# Patient Record
Sex: FEMALE | Race: WHITE | NOT HISPANIC OR LATINO | Employment: UNEMPLOYED | URBAN - METROPOLITAN AREA
[De-identification: names, ages, dates, MRNs, and addresses within clinical notes are randomized per-mention and may not be internally consistent; named-entity substitution may affect disease eponyms.]

---

## 2017-01-03 ENCOUNTER — TELEPHONE (OUTPATIENT)
Dept: ORTHOPEDICS | Facility: CLINIC | Age: 27
End: 2017-01-03

## 2017-01-04 ENCOUNTER — TELEPHONE (OUTPATIENT)
Dept: ORTHOPEDICS | Facility: CLINIC | Age: 27
End: 2017-01-04

## 2017-01-04 NOTE — TELEPHONE ENCOUNTER
----- Message from Beth Sesay sent at 1/4/2017  2:19 PM CST -----  Contact: Patient  Patient called to speak with the nurse; she left a message yesterday about her medical leave paperwork and no one has responded. She stated she needs it today or she will be fired. Please call her at [phone].    Thanks,  Beth

## 2017-01-05 ENCOUNTER — TELEPHONE (OUTPATIENT)
Dept: ORTHOPEDICS | Facility: CLINIC | Age: 27
End: 2017-01-05

## 2017-01-05 NOTE — TELEPHONE ENCOUNTER
Spoke with patient and confirmed x-ray appointment for 1-9-2017. Pt states she will call back if she cant make it.

## 2017-01-06 ENCOUNTER — TELEPHONE (OUTPATIENT)
Dept: ORTHOPEDICS | Facility: CLINIC | Age: 27
End: 2017-01-06

## 2017-01-06 NOTE — TELEPHONE ENCOUNTER
Patient stated that her previous RTW note did not have restrictions on it and the paperwork she just picked up said she has restrictions such as no lifting and no climbing, etc.  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 Her work will not allow her to go back with restrictions.    I spoke with the nursing staff and they stated that Dr. Cancino is who will have to change the paperwork. He is in surgery right now. The patient has an appt on Monday. We can address the issue when she comes in on Monday.     She verbalized understanding.

## 2017-01-06 NOTE — TELEPHONE ENCOUNTER
----- Message from Irma Vizcaino sent at 1/6/2017 12:50 PM CST -----  Contact: pt  Call pt at [phone]//regarding a return to work statement and do i have restrictions //ignacio ht

## 2017-01-09 ENCOUNTER — OFFICE VISIT (OUTPATIENT)
Dept: ORTHOPEDICS | Facility: CLINIC | Age: 27
End: 2017-01-09
Payer: COMMERCIAL

## 2017-01-09 ENCOUNTER — HOSPITAL ENCOUNTER (OUTPATIENT)
Dept: RADIOLOGY | Facility: HOSPITAL | Age: 27
Discharge: HOME | End: 2017-01-09
Attending: ORTHOPAEDIC SURGERY
Payer: COMMERCIAL

## 2017-01-09 VITALS
BODY MASS INDEX: 24.83 KG/M2 | HEIGHT: 68 IN | SYSTOLIC BLOOD PRESSURE: 117 MMHG | WEIGHT: 163.81 LBS | HEART RATE: 69 BPM | DIASTOLIC BLOOD PRESSURE: 74 MMHG

## 2017-01-09 DIAGNOSIS — S92.504D CLOSED NONDISPLACED FRACTURE OF PHALANX OF LESSER TOE OF RIGHT FOOT WITH ROUTINE HEALING, UNSPECIFIED PHALANX, SUBSEQUENT ENCOUNTER: Primary | ICD-10-CM

## 2017-01-09 DIAGNOSIS — M79.671 RIGHT FOOT PAIN: ICD-10-CM

## 2017-01-09 PROCEDURE — 99999 PR PBB SHADOW E&M-EST. PATIENT-LVL III: CPT | Mod: PBBFAC,,, | Performed by: ORTHOPAEDIC SURGERY

## 2017-01-09 PROCEDURE — 99024 POSTOP FOLLOW-UP VISIT: CPT | Mod: S$GLB,,, | Performed by: ORTHOPAEDIC SURGERY

## 2017-01-09 PROCEDURE — 73630 X-RAY EXAM OF FOOT: CPT | Mod: TC,RT

## 2017-01-09 PROCEDURE — 73630 X-RAY EXAM OF FOOT: CPT | Mod: 26,RT,, | Performed by: RADIOLOGY

## 2017-01-24 NOTE — PROGRESS NOTES
Subjective:      Patient ID: Idalia Sanders is a 26 y.o. female.    Chief Complaint: Pain of the Right Foot    HPI: The patient presents in follow-up for a healing fracture of her right fifth/little toe proximal phalanx.  She has done nicely and is clinically nontender.    Review of Systems   Gastrointestinal: Negative.  Negative for constipation, diarrhea, nausea and vomiting.         Objective:            Ortho/SPM Exam the patient has no tenderness to palpation of the little toe.  She has some minimal residual swelling as might be expected.              X-RAY: None performed today.    Assessment:           Encounter Diagnosis   Name Primary?    Closed nondisplaced fracture of phalanx of lesser toe of right foot with routine healing, unspecified phalanx, subsequent encounter Yes          Plan:     The patient was instructed on desensitizing massage.  She is cleared for all activities without restriction at this time.  She will be seen back as needed in the future.

## 2017-03-24 ENCOUNTER — OFFICE VISIT (OUTPATIENT)
Dept: FAMILY MEDICINE | Facility: CLINIC | Age: 27
End: 2017-03-24
Payer: COMMERCIAL

## 2017-03-24 VITALS
TEMPERATURE: 96 F | SYSTOLIC BLOOD PRESSURE: 122 MMHG | OXYGEN SATURATION: 98 % | BODY MASS INDEX: 22.73 KG/M2 | DIASTOLIC BLOOD PRESSURE: 70 MMHG | WEIGHT: 150 LBS | HEIGHT: 68 IN | HEART RATE: 100 BPM

## 2017-03-24 DIAGNOSIS — R51.9 HEADACHE, UNSPECIFIED HEADACHE TYPE: ICD-10-CM

## 2017-03-24 DIAGNOSIS — K52.9 GASTROENTERITIS: Primary | ICD-10-CM

## 2017-03-24 DIAGNOSIS — E86.0 MILD DEHYDRATION: ICD-10-CM

## 2017-03-24 DIAGNOSIS — R11.2 NON-INTRACTABLE VOMITING WITH NAUSEA, UNSPECIFIED VOMITING TYPE: ICD-10-CM

## 2017-03-24 DIAGNOSIS — R19.7 DIARRHEA, UNSPECIFIED TYPE: ICD-10-CM

## 2017-03-24 PROCEDURE — 96372 THER/PROPH/DIAG INJ SC/IM: CPT | Mod: S$GLB,,, | Performed by: FAMILY MEDICINE

## 2017-03-24 PROCEDURE — 99214 OFFICE O/P EST MOD 30 MIN: CPT | Mod: 25,S$GLB,, | Performed by: FAMILY MEDICINE

## 2017-03-24 PROCEDURE — 99999 PR PBB SHADOW E&M-EST. PATIENT-LVL III: CPT | Mod: PBBFAC,,, | Performed by: FAMILY MEDICINE

## 2017-03-24 RX ORDER — ONDANSETRON 4 MG/1
4 TABLET, ORALLY DISINTEGRATING ORAL EVERY 6 HOURS PRN
Qty: 20 TABLET | Refills: 0 | Status: SHIPPED | OUTPATIENT
Start: 2017-03-24 | End: 2017-07-07

## 2017-03-24 RX ORDER — PROMETHAZINE HYDROCHLORIDE 25 MG/ML
25 INJECTION, SOLUTION INTRAMUSCULAR; INTRAVENOUS
Status: COMPLETED | OUTPATIENT
Start: 2017-03-24 | End: 2017-03-24

## 2017-03-24 RX ADMIN — PROMETHAZINE HYDROCHLORIDE 25 MG: 25 INJECTION, SOLUTION INTRAMUSCULAR; INTRAVENOUS at 11:03

## 2017-03-24 NOTE — PATIENT INSTRUCTIONS
Diet for Vomiting or Diarrhea (Adult)    Your symptoms may return or get worse after eating certain foods listed below. If this happens, stop eating these foods until your symptoms ease and you feel better.  Once the vomiting stops, follow the steps below.   During the first 12 to 24 hours  During the first 12 to 24 hours, follow this diet:  · Drinks. Plain water, sport drinks like electrolyte solutions, soft drinks without caffeine, mineral water (plain or flavored), clear fruit juices, and decaffeinated tea and coffee.  · Soups. Clear broth.  · Desserts. Plain gelatin, popsicles, and fruit juice bars. As you feel better, you may add 6 to 8 ounces of yogurt per day. If you have diarrhea, don't have foods or drinks that contain sugar, high-fructose corn syrup, or sugar alcohols.  During the next 24 hours  During the next 24 hours you may add the following to the above:  · Hot cereal, plain toast, bread, rolls, and crackers  · Plain noodles, rice, mashed potatoes, and chicken noodle or rice soup  · Unsweetened canned fruit (but not pineapple) and bananas  Don't eat more than 15 grams of fat a day. Do this by staying away from margarine, butter, oils, mayonnaise, sauces, gravies, fried foods, peanut butter, meat, poultry, and fish.  Don't eat much fiber. Stay away from raw or cooked vegetables, fresh fruits (except bananas), and bran cereals.  Limit how much caffeine and chocolate you have. Do not use any spices or seasonings except salt.  During the next 24 hours  Slowly go back to your normal diet, as you feel better and your symptoms ease.  Date Last Reviewed: 8/1/2016  © 9785-0212 [employer]. [address] [place] [address], [place] [address]. All rights reserved. This information is not intended as a substitute for professional medical care. Always follow your healthcare professional's instructions.

## 2017-03-24 NOTE — LETTER
March 24, 2017      [facility]  [address] [facility] [address]  Phone: [phone]  Fax: [phone]       Patient: Idalia Sanders   YOB: 1990  Date of Visit: 03/24/2017    To Whom It May Concern:    Idalia was seen at [facility] on 03/24/2017. Patient was also ill March 23,2017. If you have any questions or concerns, or if I can be of further assistance, please do not hesitate to contact me.    Sincerely,        Dr Pfeiffer

## 2017-03-24 NOTE — LETTER
March 24, 2017      [facility]  [address] [facility] [address]  Phone: [phone]  Fax: [phone]       Patient: Idalia Sanders   YOB: 1990  Date of Visit: 03/24/2017    To Whom It May Concern:    Idalia was seen at [facility] on 03/24/2017 patient was ill March 23,2017 also. She may return to work on March 27,2107 . If you have any questions or concerns, or if I can be of further assistance, please do not hesitate to contact me.    Sincerely,          Dr. Pfeiffer

## 2017-03-24 NOTE — PROGRESS NOTES
Subjective:       Patient ID: Idalia Sanders is a 26 y.o. female.    Chief Complaint: Abdominal Pain (for 2 days/vomiting); Headache; Nausea; Fever; and Diarrhea      HPI Comments:   Peviously healthy. Abrupt onset 2 days ago of frontal HA, followed by diarrhea vomiting. No blood. Crampy abd. pain.No HA currently. Able to keep down some fluids and toast. Last vomit yest. Lots of co-workers with same. Decr. Urine output. Regular period, on now. No cold, cough symptoms.  Abdominal Pain   The quality of the pain is cramping. The abdominal pain radiates to the epigastric region. Associated symptoms include anorexia, belching, diarrhea, flatus, headaches, myalgias, nausea, vomiting and weight loss. Pertinent negatives include no arthralgias, constipation, dysuria, fever, frequency or hematuria. The pain is aggravated by eating and certain positions. The pain is relieved by belching, certain positions, bowel movements, passing flatus and vomiting. Treatments tried: tylenol, zantac, midol. The treatment provided no relief.   Diarrhea    Associated symptoms include abdominal pain, headaches, increased flatus, myalgias, vomiting and weight loss. Pertinent negatives include no arthralgias, chills, coughing or fever.     Review of Systems   Constitutional: Positive for activity change, appetite change, unexpected weight change and weight loss. Negative for chills, fatigue and fever.   HENT: Negative for congestion, ear pain, rhinorrhea and sore throat.    Eyes: Negative for photophobia and visual disturbance.   Respiratory: Negative for cough and shortness of breath.    Cardiovascular: Negative for chest pain, palpitations and leg swelling.   Gastrointestinal: Positive for abdominal pain, anorexia, diarrhea, flatus, nausea and vomiting. Negative for blood in stool and constipation.   Endocrine: Negative for cold intolerance, heat intolerance and polyuria.   Genitourinary: Negative for dysuria, frequency and hematuria.    Musculoskeletal: Positive for myalgias. Negative for arthralgias, back pain, joint swelling and neck stiffness.   Skin: Negative for rash.   Allergic/Immunologic: Negative for environmental allergies and food allergies.   Neurological: Positive for headaches. Negative for dizziness and weakness.   Hematological: Negative for adenopathy.   Psychiatric/Behavioral: Negative for confusion and sleep disturbance. The patient is not nervous/anxious.        Objective:      Physical Exam   Constitutional: She is oriented to person, place, and time. She appears well-developed and well-nourished. No distress.   Appears moderately ill. Moves well around the room.   HENT:   Head: Normocephalic.   Right Ear: External ear normal.   Left Ear: External ear normal.   Mouth/Throat: Oropharynx is clear and moist. No oropharyngeal exudate.   Eyes: Conjunctivae and EOM are normal. Pupils are equal, round, and reactive to light. Right eye exhibits no discharge. Left eye exhibits no discharge. No scleral icterus.   Neck: Normal range of motion. Neck supple. No JVD present. No thyromegaly present.   Cardiovascular: Normal rate, regular rhythm, normal heart sounds and intact distal pulses.  Exam reveals no gallop and no friction rub.    No murmur heard.  Pulmonary/Chest: Effort normal and breath sounds normal. No respiratory distress. She has no wheezes. She has no rales.   Abdominal: Soft. Bowel sounds are normal. She exhibits no distension and no mass. There is no tenderness. There is no guarding.   Musculoskeletal: Normal range of motion. She exhibits no edema.   Lymphadenopathy:     She has no cervical adenopathy.   Neurological: She is alert and oriented to person, place, and time. No cranial nerve deficit.   Skin: Skin is warm and dry. No rash noted. She is not diaphoretic.   Psychiatric: She has a normal mood and affect. Her behavior is normal. Judgment and thought content normal.   Vitals reviewed.      Assessment:       1.  Gastroenteritis    2. Headache, unspecified headache type    3. Mild dehydration    4. Diarrhea, unspecified type    5. Non-intractable vomiting with nausea, unspecified vomiting type        Plan:   Gastroenteritis  Comments:  phenergan/zofran, immodium, rest, clear fluids, advance diet as tolerated. Work release    Headache, unspecified headache type  Comments:  No meningeal signs. Rec tylenol.    Mild dehydration  Comments:  decr. urine output. 5 lb weight loss per pt. Tachycardia    Diarrhea, unspecified type  Comments:  Rec. immodium.    Non-intractable vomiting with nausea, unspecified vomiting type  -     promethazine injection 25 mg; Inject 1 mL (25 mg total) into the muscle one time.    Other orders  -     ondansetron (ZOFRAN-ODT) 4 MG TbDL; Take 1 tablet (4 mg total) by mouth every 6 (six) hours as needed.  Dispense: 20 tablet; Refill: 0

## 2017-03-24 NOTE — MR AVS SNAPSHOT
[facility]  [address]  [facility] [address]  Phone: [phone]  Fax: [phone]                  Idalia Sanders   3/24/2017 10:20 AM   Office Visit    Description:  Female : 1990   Provider:  Pedro Pfeiffer MD   Department:  [facility]           Reason for Visit     Abdominal Pain     Headache     Nausea     Fever     Diarrhea           Diagnoses this Visit        Comments    Gastroenteritis    -  Primary phenergan/zofran, immodium, rest, clear fluids, advance diet as tolerated. Work release    Headache, unspecified headache type     No meningeal signs    Mild dehydration     decr. urine output. 5 lb weight loss per pt. Tachycardia    Diarrhea, unspecified type         Non-intractable vomiting with nausea, unspecified vomiting type                To Do List           Goals (5 Years of Data)     None      Follow-Up and Disposition     Return if symptoms worsen or fail to improve.       These Medications        Disp Refills Start End    ondansetron (ZOFRAN-ODT) 4 MG TbDL 20 tablet 0 3/24/2017     Take 1 tablet (4 mg total) by mouth every 6 (six) hours as needed. - Oral    Pharmacy: [facility] [address] [facility] [address] [facility] #: [phone]         [facility] On Call     [facility] On Call Nurse [facility] Emergency Department Line -  Assistance  Registered nurses in the [facility] Center provide clinical advisement, health education, appointment booking, and other advisory services.  Call for this free service at [phone].             Medications           Message regarding Medications     Verify the changes and/or additions to your medication regime listed below are the same as discussed with your clinician today.  If any of these changes or additions are incorrect, please notify your healthcare provider.        START taking these NEW medications        Refills    ondansetron (ZOFRAN-ODT) 4 MG TbDL 0    Sig: Take 1 tablet (4 mg  "total) by mouth every 6 (six) hours as needed.    Class: Normal    Route: Oral      These medications were administered today        Dose Freq    promethazine injection 25 mg 25 mg Clinic/HOD 1 time    Sig: Inject 1 mL (25 mg total) into the muscle one time.    Class: Normal    Route: Intramuscular           Verify that the below list of medications is an accurate representation of the medications you are currently taking.  If none reported, the list may be blank. If incorrect, please contact your healthcare provider. Carry this list with you in case of emergency.           Current Medications     albuterol 90 mcg/actuation inhaler Inhale 2 puffs into the lungs every 4 (four) hours as needed for Wheezing.    loratadine (CLARITIN) 10 mg tablet Take 10 mg by mouth once daily.    norgestrel-ethinyl estradiol (CRYSELLE, 28,) 0.3-30 mg-mcg per tablet Take 1 tablet by mouth once daily.    omeprazole (PRILOSEC OTC) 20 MG tablet Take 20 mg by mouth every morning.    ondansetron (ZOFRAN-ODT) 4 MG TbDL Take 1 tablet (4 mg total) by mouth every 6 (six) hours as needed.    valacyclovir (VALTREX) 1000 MG tablet            Clinical Reference Information           Your Vitals Were     BP Pulse Temp Height Weight Last Period    122/70 100 96 °F (35.6 °C) (Oral) 5' 8" (1.727 m) 68 kg (150 lb) 03/22/2017    SpO2 BMI             98% 22.81 kg/m2         Blood Pressure          Most Recent Value    BP  122/70      Allergies as of 3/24/2017     Ceclor [Cefaclor]    Doxycycline    Meloxicam    Ciprofloxacin      Immunizations Administered on Date of Encounter - 3/24/2017     None      Administrations This Visit     promethazine injection 25 mg     Admin Date Action Dose Route Administered By             03/24/2017 Given 25 mg Intramuscular Taylor Blanchard LPN                      Instructions      Diet for Vomiting or Diarrhea (Adult)    Your symptoms may return or get worse after eating certain foods listed below. If this happens, stop " eating these foods until your symptoms ease and you feel better.  Once the vomiting stops, follow the steps below.   During the first 12 to 24 hours  During the first 12 to 24 hours, follow this diet:  · Drinks. Plain water, sport drinks like electrolyte solutions, soft drinks without caffeine, mineral water (plain or flavored), clear fruit juices, and decaffeinated tea and coffee.  · Soups. Clear broth.  · Desserts. Plain gelatin, popsicles, and fruit juice bars. As you feel better, you may add 6 to 8 ounces of yogurt per day. If you have diarrhea, don't have foods or drinks that contain sugar, high-fructose corn syrup, or sugar alcohols.  During the next 24 hours  During the next 24 hours you may add the following to the above:  · Hot cereal, plain toast, bread, rolls, and crackers  · Plain noodles, rice, mashed potatoes, and chicken noodle or rice soup  · Unsweetened canned fruit (but not pineapple) and bananas  Don't eat more than 15 grams of fat a day. Do this by staying away from margarine, butter, oils, mayonnaise, sauces, gravies, fried foods, peanut butter, meat, poultry, and fish.  Don't eat much fiber. Stay away from raw or cooked vegetables, fresh fruits (except bananas), and bran cereals.  Limit how much caffeine and chocolate you have. Do not use any spices or seasonings except salt.  During the next 24 hours  Slowly go back to your normal diet, as you feel better and your symptoms ease.  Date Last Reviewed: 8/1/2016 © 2000-2016 [employer] [address] [place] [id number]. All rights reserved. This information is not intended as a substitute for professional medical care. Always follow your healthcare professional's instructions.             Language Assistance Services     ATTENTION: Language assistance services are available, free of charge. Please call [phone].      ATENCIÓN: Si gilberto falcon, tiene a cassidy disposición servicios gratuitos de asistencia lingüística.  [place] [phone].     ALMA ROSA Ý: N?u b?n nói Ti?ng Vi?t, có các d?ch v? h? tr? ngôn ng? mi?n phí dành cho b?n. G?i s? [phone].         [facility] complies with applicable Federal civil rights laws and does not discriminate on the basis of race, color, national origin, age, disability, or sex.

## 2017-03-24 NOTE — PROGRESS NOTES
"Allergies reviewed with patient.  Promethazine 25 mg im left ventrogluteal.  Pt instructed to wait 15 min with side effect/  no adverse reactions" noted  Taylor Blanchard Lpn  "

## 2017-07-05 ENCOUNTER — LAB VISIT (OUTPATIENT)
Dept: LAB | Facility: HOSPITAL | Age: 27
End: 2017-07-05
Attending: FAMILY MEDICINE
Payer: COMMERCIAL

## 2017-07-05 ENCOUNTER — OFFICE VISIT (OUTPATIENT)
Dept: INTERNAL MEDICINE | Facility: CLINIC | Age: 27
End: 2017-07-05
Payer: COMMERCIAL

## 2017-07-05 VITALS
DIASTOLIC BLOOD PRESSURE: 72 MMHG | HEART RATE: 75 BPM | SYSTOLIC BLOOD PRESSURE: 110 MMHG | HEIGHT: 68 IN | OXYGEN SATURATION: 98 % | BODY MASS INDEX: 23.32 KG/M2 | WEIGHT: 153.88 LBS | TEMPERATURE: 97 F

## 2017-07-05 DIAGNOSIS — K21.9 GASTROESOPHAGEAL REFLUX DISEASE WITHOUT ESOPHAGITIS: ICD-10-CM

## 2017-07-05 DIAGNOSIS — R10.9 ABDOMINAL PAIN, ACUTE: ICD-10-CM

## 2017-07-05 DIAGNOSIS — K21.9 GASTROESOPHAGEAL REFLUX DISEASE WITHOUT ESOPHAGITIS: Primary | ICD-10-CM

## 2017-07-05 LAB
ALBUMIN SERPL BCP-MCNC: 4.7 G/DL
ALP SERPL-CCNC: 86 U/L
ALT SERPL W/O P-5'-P-CCNC: 12 U/L
AMYLASE SERPL-CCNC: 44 U/L
ANION GAP SERPL CALC-SCNC: 9 MMOL/L
AST SERPL-CCNC: 17 U/L
B-HCG UR QL: NEGATIVE
BASOPHILS # BLD AUTO: 0.03 K/UL
BASOPHILS NFR BLD: 0.5 %
BILIRUB SERPL-MCNC: 0.5 MG/DL
BUN SERPL-MCNC: 16 MG/DL
CALCIUM SERPL-MCNC: 10.1 MG/DL
CHLORIDE SERPL-SCNC: 101 MMOL/L
CO2 SERPL-SCNC: 30 MMOL/L
CREAT SERPL-MCNC: 0.7 MG/DL
DIFFERENTIAL METHOD: NORMAL
EOSINOPHIL # BLD AUTO: 0.2 K/UL
EOSINOPHIL NFR BLD: 2.8 %
ERYTHROCYTE [DISTWIDTH] IN BLOOD BY AUTOMATED COUNT: 12.7 %
EST. GFR  (AFRICAN AMERICAN): >60 ML/MIN/1.73 M^2
EST. GFR  (NON AFRICAN AMERICAN): >60 ML/MIN/1.73 M^2
GLUCOSE SERPL-MCNC: 101 MG/DL
HCT VFR BLD AUTO: 45.5 %
HGB BLD-MCNC: 15.1 G/DL
LIPASE SERPL-CCNC: 39 U/L
LYMPHOCYTES # BLD AUTO: 1.9 K/UL
LYMPHOCYTES NFR BLD: 31.8 %
MCH RBC QN AUTO: 30.1 PG
MCHC RBC AUTO-ENTMCNC: 33.2 %
MCV RBC AUTO: 91 FL
MONOCYTES # BLD AUTO: 0.4 K/UL
MONOCYTES NFR BLD: 6.9 %
NEUTROPHILS # BLD AUTO: 3.4 K/UL
NEUTROPHILS NFR BLD: 57.7 %
PLATELET # BLD AUTO: 236 K/UL
PMV BLD AUTO: 10.6 FL
POTASSIUM SERPL-SCNC: 4.9 MMOL/L
PROT SERPL-MCNC: 7.9 G/DL
RBC # BLD AUTO: 5.02 M/UL
SODIUM SERPL-SCNC: 140 MMOL/L
WBC # BLD AUTO: 5.97 K/UL

## 2017-07-05 PROCEDURE — 99999 PR PBB SHADOW E&M-EST. PATIENT-LVL IV: CPT | Mod: PBBFAC,,, | Performed by: PHYSICIAN ASSISTANT

## 2017-07-05 PROCEDURE — 80053 COMPREHEN METABOLIC PANEL: CPT

## 2017-07-05 PROCEDURE — 82150 ASSAY OF AMYLASE: CPT

## 2017-07-05 PROCEDURE — 36415 COLL VENOUS BLD VENIPUNCTURE: CPT

## 2017-07-05 PROCEDURE — 99214 OFFICE O/P EST MOD 30 MIN: CPT | Mod: S$GLB,,, | Performed by: PHYSICIAN ASSISTANT

## 2017-07-05 PROCEDURE — 83690 ASSAY OF LIPASE: CPT

## 2017-07-05 PROCEDURE — 81025 URINE PREGNANCY TEST: CPT

## 2017-07-05 PROCEDURE — 85025 COMPLETE CBC W/AUTO DIFF WBC: CPT

## 2017-07-05 RX ORDER — PANTOPRAZOLE SODIUM 40 MG/1
40 TABLET, DELAYED RELEASE ORAL DAILY
Qty: 30 TABLET | Refills: 1 | Status: ON HOLD | OUTPATIENT
Start: 2017-07-05 | End: 2017-07-10
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
## 2017-07-07 ENCOUNTER — OFFICE VISIT (OUTPATIENT)
Dept: GASTROENTEROLOGY | Facility: CLINIC | Age: 27
End: 2017-07-07
Payer: COMMERCIAL

## 2017-07-07 VITALS
WEIGHT: 156.5 LBS | HEART RATE: 76 BPM | DIASTOLIC BLOOD PRESSURE: 60 MMHG | HEIGHT: 68 IN | SYSTOLIC BLOOD PRESSURE: 112 MMHG | BODY MASS INDEX: 23.72 KG/M2

## 2017-07-07 DIAGNOSIS — K21.9 GASTROESOPHAGEAL REFLUX DISEASE, ESOPHAGITIS PRESENCE NOT SPECIFIED: ICD-10-CM

## 2017-07-07 DIAGNOSIS — R11.0 NAUSEA: ICD-10-CM

## 2017-07-07 DIAGNOSIS — R10.13 EPIGASTRIC PAIN: Primary | ICD-10-CM

## 2017-07-07 PROCEDURE — 99204 OFFICE O/P NEW MOD 45 MIN: CPT | Mod: S$GLB,,, | Performed by: NURSE PRACTITIONER

## 2017-07-07 PROCEDURE — 99999 PR PBB SHADOW E&M-EST. PATIENT-LVL III: CPT | Mod: PBBFAC,,, | Performed by: NURSE PRACTITIONER

## 2017-07-07 RX ORDER — SUCRALFATE 1 G/1
1 TABLET ORAL
Qty: 120 TABLET | Refills: 0 | Status: SHIPPED | OUTPATIENT
Start: 2017-07-07 | End: 2017-08-18

## 2017-07-07 NOTE — LETTER
July 7, 2017      Rupa Canchola DO  [address] [place] [address] [address] [address]           [facility]  [address] [place] [address] [address] [place] [address]  Phone: [phone]  Fax: [phone]          Patient: Idalia Hooper   MR Number: [id number]   YOB: 1990   Date of Visit: 7/7/2017       Dear Dr. Rupa Canchola:    Thank you for referring Idalia Hooper to me for evaluation. Attached you will find relevant portions of my assessment and plan of care.    If you have questions, please do not hesitate to call me. I look forward to following Idalia Hooper along with you.    Sincerely,    Philly Tucker, NP    Enclosure  CC:  No Recipients    If you would like to receive this communication electronically, please contact [email or web address] or [phone] to request more information on [employer] Link access.    For providers and/or their staff who would like to refer a patient to [facility], please contact us through our one-stop-shop provider referral line, Severo Bee, at [phone].    If you feel you have received this communication in error or would no longer like to receive these types of communications, please e-mail [email or web address]

## 2017-07-07 NOTE — PROGRESS NOTES
Clinic Consult:  [facility] Consultation Note    Reason for Consult:  The primary encounter diagnosis was Epigastric pain. Diagnoses of Nausea and Gastroesophageal reflux disease, esophagitis presence not specified were also pertinent to this visit.    PCP: Felicia Khan   [address] [place] [address]  / [place] [id number]    HPI:  This is a 26 y.o. female here for evaluation of the above. She was referred to me by JAMES Vega. Patient presents to clinic today with complaints of epigastric pain, GERD, and nausea. Epigastric pain is constant and is burning in character. She has had a prior episode and was assumed that she had an ulcer. She has never had an EGD before. Onset of symptoms began 1 week ago. Symptom are worse on an empty stomach and improve some after eating, however, certain foods will cause worsening of symptoms. She takes Zantac pretty regularly for GERD. She was recently put on daily PPI and reports symptom have improved some. She has a history of GERD and it is especially worse at night when laying down.     Review of Systems   Constitutional: Negative for fever, malaise/fatigue and weight loss.   HENT: Negative for sore throat.    Respiratory: Negative for cough and wheezing.    Cardiovascular: Negative for chest pain and palpitations.   Gastrointestinal: Positive for abdominal pain (epigastric pain), heartburn and nausea. Negative for blood in stool, constipation, diarrhea, melena and vomiting.   Genitourinary: Negative for dysuria and frequency.   Musculoskeletal: Negative for back pain, joint pain, myalgias and neck pain.   Skin: Negative for itching and rash.   Neurological: Negative for dizziness, speech change, seizures, loss of consciousness and headaches.   Psychiatric/Behavioral: Negative for depression and substance abuse. The patient is not nervous/anxious.        Medical History:  has a past medical history of Acne; ADD (attention deficit disorder); Anxiety;  "Exercise-induced asthma; History of febrile seizure; HSV-1 infection; Insomnia; and PTSD (post-traumatic stress disorder).    Surgical History:  has a past surgical history that includes [place] tooth extraction (Bilateral, 07/2016).    Family History: family history includes Asthma in her mother; Diabetes in her mother..     Social History:  reports that she quit smoking about 1 years ago. She has a 0.75 pack-year smoking history. She has never used smokeless tobacco. She reports that she drinks alcohol. She reports that she does not use drugs.    Allergies: Reviewed    Home Medications:   Current Outpatient Prescriptions on File Prior to Visit   Medication Sig Dispense Refill    albuterol 90 mcg/actuation inhaler Inhale 2 puffs into the lungs every 4 (four) hours as needed for Wheezing. 18 g 2    loratadine (CLARITIN) 10 mg tablet Take 10 mg by mouth once daily.      pantoprazole (PROTONIX) 40 MG tablet Take 1 tablet (40 mg total) by mouth once daily. 30 tablet 1    ranitidine (ZANTAC) 150 MG tablet Take 1 tablet (150 mg total) by mouth nightly. 30 tablet 0    [DISCONTINUED] norgestrel-ethinyl estradiol (CRYSELLE, 28,) 0.3-30 mg-mcg per tablet Take 1 tablet by mouth once daily. 30 tablet 11    [DISCONTINUED] omeprazole (PRILOSEC OTC) 20 MG tablet Take 20 mg by mouth every morning.      [DISCONTINUED] ondansetron (ZOFRAN-ODT) 4 MG TbDL Take 1 tablet (4 mg total) by mouth every 6 (six) hours as needed. 20 tablet 0    [DISCONTINUED] valacyclovir (VALTREX) 1000 MG tablet        No current facility-administered medications on file prior to visit.        Physical Exam:  Vital Signs:  /60   Pulse 76   Ht 5' 8" (1.727 m)   Wt 71 kg (156 lb 8.4 oz)   BMI 23.80 kg/m²   Body mass index is 23.8 kg/m².  Physical Exam   Constitutional: She is oriented to person, place, and time and well-developed, well-nourished, and in no distress. No distress.   HENT:   Head: Normocephalic.   Eyes: Conjunctivae are normal. " Pupils are equal, round, and reactive to light.   Cardiovascular: Normal rate, regular rhythm and normal heart sounds.    Pulmonary/Chest: Effort normal and breath sounds normal. No respiratory distress.   Abdominal: Soft. Bowel sounds are normal. She exhibits no distension. There is tenderness in the epigastric area.   Neurological: She is alert and oriented to person, place, and time. No cranial nerve deficit.   Skin: Skin is warm and dry. No rash noted.   Psychiatric: Mood and affect normal.       Labs: Pertinent labs reviewed.    Assessment:  1. Epigastric pain    2. Nausea    3. Gastroesophageal reflux disease, esophagitis presence not specified           Recommendations:  Unclear etiology of symptoms, however, may be due to PUD.   Will schedule EGD for further evaluation and to R/O other etiologies such as gastritis or H. Pylori.   Increase pantoprazole to BID, will add Carafate four times a day.   Patient is not pregnant at this time but was wanting to start trying very soon. Discussed with patient that we would not be able to do endoscopy with her being pregnant. I encouraged her to wait until after endoscopy procedure. She states she will take my advise.   -     Case request GI: ESOPHAGOGASTRODUODENOSCOPY (EGD)  -     sucralfate (CARAFATE) 1 gram tablet; Take 1 tablet (1 g total) by mouth 4 (four) times daily before meals and nightly.  Dispense: 120 tablet; Refill: 0    Return in about 6 weeks (around 8/18/2017).    Thank you so much for allowing me to participate in the care of Idalia Hooper  RENO Pina

## 2017-07-10 ENCOUNTER — HOSPITAL ENCOUNTER (OUTPATIENT)
Facility: HOSPITAL | Age: 27
Discharge: HOME | End: 2017-07-10
Attending: INTERNAL MEDICINE | Admitting: INTERNAL MEDICINE
Payer: COMMERCIAL

## 2017-07-10 ENCOUNTER — ANESTHESIA EVENT (OUTPATIENT)
Dept: ENDOSCOPY | Facility: HOSPITAL | Age: 27
End: 2017-07-10
Payer: COMMERCIAL

## 2017-07-10 ENCOUNTER — SURGERY (OUTPATIENT)
Age: 27
End: 2017-07-10

## 2017-07-10 ENCOUNTER — ANESTHESIA (OUTPATIENT)
Dept: ENDOSCOPY | Facility: HOSPITAL | Age: 27
End: 2017-07-10
Payer: COMMERCIAL

## 2017-07-10 VITALS
SYSTOLIC BLOOD PRESSURE: 112 MMHG | TEMPERATURE: 98 F | HEIGHT: 68 IN | OXYGEN SATURATION: 98 % | DIASTOLIC BLOOD PRESSURE: 72 MMHG | RESPIRATION RATE: 16 BRPM | HEART RATE: 57 BPM | WEIGHT: 155 LBS | BODY MASS INDEX: 23.49 KG/M2

## 2017-07-10 VITALS — RESPIRATION RATE: 15 BRPM

## 2017-07-10 DIAGNOSIS — K21.9 GASTROESOPHAGEAL REFLUX DISEASE, ESOPHAGITIS PRESENCE NOT SPECIFIED: Primary | ICD-10-CM

## 2017-07-10 DIAGNOSIS — K21.9 GERD (GASTROESOPHAGEAL REFLUX DISEASE): ICD-10-CM

## 2017-07-10 DIAGNOSIS — K29.30 SUPERFICIAL GASTRITIS WITHOUT HEMORRHAGE, UNSPECIFIED CHRONICITY: ICD-10-CM

## 2017-07-10 LAB
B-HCG UR QL: NEGATIVE
CTP QC/QA: YES

## 2017-07-10 PROCEDURE — 27201012 HC FORCEPS, HOT/COLD, DISP: Performed by: INTERNAL MEDICINE

## 2017-07-10 PROCEDURE — 43239 EGD BIOPSY SINGLE/MULTIPLE: CPT | Mod: ,,, | Performed by: INTERNAL MEDICINE

## 2017-07-10 PROCEDURE — 88305 TISSUE EXAM BY PATHOLOGIST: CPT | Performed by: PATHOLOGY

## 2017-07-10 PROCEDURE — 25000003 PHARM REV CODE 250: Performed by: INTERNAL MEDICINE

## 2017-07-10 PROCEDURE — 37000008 HC ANESTHESIA 1ST 15 MINUTES: Performed by: INTERNAL MEDICINE

## 2017-07-10 PROCEDURE — 25000003 PHARM REV CODE 250: Performed by: NURSE ANESTHETIST, CERTIFIED REGISTERED

## 2017-07-10 PROCEDURE — 88305 TISSUE EXAM BY PATHOLOGIST: CPT | Mod: 26,,, | Performed by: PATHOLOGY

## 2017-07-10 PROCEDURE — 43239 EGD BIOPSY SINGLE/MULTIPLE: CPT | Performed by: INTERNAL MEDICINE

## 2017-07-10 PROCEDURE — 81025 URINE PREGNANCY TEST: CPT | Performed by: INTERNAL MEDICINE

## 2017-07-10 PROCEDURE — 63600175 PHARM REV CODE 636 W HCPCS: Performed by: NURSE ANESTHETIST, CERTIFIED REGISTERED

## 2017-07-10 RX ORDER — PANTOPRAZOLE SODIUM 40 MG/1
40 TABLET, DELAYED RELEASE ORAL 2 TIMES DAILY
Qty: 60 TABLET | Refills: 1 | Status: SHIPPED | OUTPATIENT
Start: 2017-07-10 | End: 2017-08-18 | Stop reason: SDUPTHER

## 2017-07-10 RX ORDER — LIDOCAINE HYDROCHLORIDE 10 MG/ML
INJECTION INFILTRATION; PERINEURAL
Status: DISCONTINUED | OUTPATIENT
Start: 2017-07-10 | End: 2017-07-10

## 2017-07-10 RX ORDER — PROPOFOL 10 MG/ML
VIAL (ML) INTRAVENOUS
Status: DISCONTINUED | OUTPATIENT
Start: 2017-07-10 | End: 2017-07-10

## 2017-07-10 RX ORDER — SODIUM CHLORIDE, SODIUM LACTATE, POTASSIUM CHLORIDE, CALCIUM CHLORIDE 600; 310; 30; 20 MG/100ML; MG/100ML; MG/100ML; MG/100ML
INJECTION, SOLUTION INTRAVENOUS CONTINUOUS
Status: DISCONTINUED | OUTPATIENT
Start: 2017-07-10 | End: 2017-07-10 | Stop reason: HOSPADM

## 2017-07-10 RX ADMIN — SODIUM CHLORIDE, SODIUM LACTATE, POTASSIUM CHLORIDE, AND CALCIUM CHLORIDE: .6; .31; .03; .02 INJECTION, SOLUTION INTRAVENOUS at 02:07

## 2017-07-10 RX ADMIN — PROPOFOL 50 MG: 10 INJECTION, EMULSION INTRAVENOUS at 03:07

## 2017-07-10 RX ADMIN — LIDOCAINE HYDROCHLORIDE 50 MG: 10 INJECTION, SOLUTION INFILTRATION; PERINEURAL at 03:07

## 2017-07-10 NOTE — ANESTHESIA PREPROCEDURE EVALUATION
07/10/2017  Idalia Hooper is a 26 y.o., female.    Anesthesia Evaluation    I have reviewed the Patient Summary Reports.    I have reviewed the Nursing Notes.   I have reviewed the Medications.     Review of Systems  Anesthesia Hx:  No problems with previous Anesthesia    Social:  Former Smoker    Pulmonary:   Asthma mild  Asthma:   Inhaler use is rescue inhaler PRN.    Hepatic/GI:   GERD  Esophageal / Stomach Disorders Gerd    Psych:   Psychiatric History          Physical Exam  General:  Well nourished    Airway/Jaw/Neck:  Airway Findings: Mouth Opening: Normal Tongue: Normal  General Airway Assessment: Adult      Dental:  Dental Findings: In tact   Chest/Lungs:  Chest/Lungs Findings: Normal Respiratory Rate     Heart/Vascular:  Heart Findings: Rate: Normal             Anesthesia Plan  Type of Anesthesia, risks & benefits discussed:  Anesthesia Type:  MAC  Patient's Preference:   Intra-op Monitoring Plan:   Intra-op Monitoring Plan Comments:   Post Op Pain Control Plan:   Post Op Pain Control Plan Comments:   Induction:   IV  Beta Blocker:  Patient is not currently on a Beta-Blocker (No further documentation required).       Informed Consent: Patient understands risks and agrees with Anesthesia plan.  Questions answered. Anesthesia consent signed with patient.  ASA Score: 2     Day of Surgery Review of History & Physical: I have interviewed and examined the patient. I have reviewed the patient's H&P dated:  There are no significant changes.          Ready For Surgery From Anesthesia Perspective.

## 2017-07-10 NOTE — H&P (VIEW-ONLY)
Clinic Consult:  [facility] Consultation Note    Reason for Consult:  The primary encounter diagnosis was Epigastric pain. Diagnoses of Nausea and Gastroesophageal reflux disease, esophagitis presence not specified were also pertinent to this visit.    PCP: Felicia Khan   [address] [place] [address]  / [place] [id number]    HPI:  This is a 26 y.o. female here for evaluation of the above. She was referred to me by JAMES Vega. Patient presents to clinic today with complaints of epigastric pain, GERD, and nausea. Epigastric pain is constant and is burning in character. She has had a prior episode and was assumed that she had an ulcer. She has never had an EGD before. Onset of symptoms began 1 week ago. Symptom are worse on an empty stomach and improve some after eating, however, certain foods will cause worsening of symptoms. She takes Zantac pretty regularly for GERD. She was recently put on daily PPI and reports symptom have improved some. She has a history of GERD and it is especially worse at night when laying down.     Review of Systems   Constitutional: Negative for fever, malaise/fatigue and weight loss.   HENT: Negative for sore throat.    Respiratory: Negative for cough and wheezing.    Cardiovascular: Negative for chest pain and palpitations.   Gastrointestinal: Positive for abdominal pain (epigastric pain), heartburn and nausea. Negative for blood in stool, constipation, diarrhea, melena and vomiting.   Genitourinary: Negative for dysuria and frequency.   Musculoskeletal: Negative for back pain, joint pain, myalgias and neck pain.   Skin: Negative for itching and rash.   Neurological: Negative for dizziness, speech change, seizures, loss of consciousness and headaches.   Psychiatric/Behavioral: Negative for depression and substance abuse. The patient is not nervous/anxious.        Medical History:  has a past medical history of Acne; ADD (attention deficit disorder); Anxiety;  "Exercise-induced asthma; History of febrile seizure; HSV-1 infection; Insomnia; and PTSD (post-traumatic stress disorder).    Surgical History:  has a past surgical history that includes [place] tooth extraction (Bilateral, 07/2016).    Family History: family history includes Asthma in her mother; Diabetes in her mother..     Social History:  reports that she quit smoking about 1 years ago. She has a 0.75 pack-year smoking history. She has never used smokeless tobacco. She reports that she drinks alcohol. She reports that she does not use drugs.    Allergies: Reviewed    Home Medications:   Current Outpatient Prescriptions on File Prior to Visit   Medication Sig Dispense Refill    albuterol 90 mcg/actuation inhaler Inhale 2 puffs into the lungs every 4 (four) hours as needed for Wheezing. 18 g 2    loratadine (CLARITIN) 10 mg tablet Take 10 mg by mouth once daily.      pantoprazole (PROTONIX) 40 MG tablet Take 1 tablet (40 mg total) by mouth once daily. 30 tablet 1    ranitidine (ZANTAC) 150 MG tablet Take 1 tablet (150 mg total) by mouth nightly. 30 tablet 0    [DISCONTINUED] norgestrel-ethinyl estradiol (CRYSELLE, 28,) 0.3-30 mg-mcg per tablet Take 1 tablet by mouth once daily. 30 tablet 11    [DISCONTINUED] omeprazole (PRILOSEC OTC) 20 MG tablet Take 20 mg by mouth every morning.      [DISCONTINUED] ondansetron (ZOFRAN-ODT) 4 MG TbDL Take 1 tablet (4 mg total) by mouth every 6 (six) hours as needed. 20 tablet 0    [DISCONTINUED] valacyclovir (VALTREX) 1000 MG tablet        No current facility-administered medications on file prior to visit.        Physical Exam:  Vital Signs:  /60   Pulse 76   Ht 5' 8" (1.727 m)   Wt 71 kg (156 lb 8.4 oz)   BMI 23.80 kg/m²   Body mass index is 23.8 kg/m².  Physical Exam   Constitutional: She is oriented to person, place, and time and well-developed, well-nourished, and in no distress. No distress.   HENT:   Head: Normocephalic.   Eyes: Conjunctivae are normal. " Pupils are equal, round, and reactive to light.   Cardiovascular: Normal rate, regular rhythm and normal heart sounds.    Pulmonary/Chest: Effort normal and breath sounds normal. No respiratory distress.   Abdominal: Soft. Bowel sounds are normal. She exhibits no distension. There is tenderness in the epigastric area.   Neurological: She is alert and oriented to person, place, and time. No cranial nerve deficit.   Skin: Skin is warm and dry. No rash noted.   Psychiatric: Mood and affect normal.       Labs: Pertinent labs reviewed.    Assessment:  1. Epigastric pain    2. Nausea    3. Gastroesophageal reflux disease, esophagitis presence not specified           Recommendations:  Unclear etiology of symptoms, however, may be due to PUD.   Will schedule EGD for further evaluation and to R/O other etiologies such as gastritis or H. Pylori.   Increase pantoprazole to BID, will add Carafate four times a day.   Patient is not pregnant at this time but was wanting to start trying very soon. Discussed with patient that we would not be able to do endoscopy with her being pregnant. I encouraged her to wait until after endoscopy procedure. She states she will take my advise.   -     Case request GI: ESOPHAGOGASTRODUODENOSCOPY (EGD)  -     sucralfate (CARAFATE) 1 gram tablet; Take 1 tablet (1 g total) by mouth 4 (four) times daily before meals and nightly.  Dispense: 120 tablet; Refill: 0    Return in about 6 weeks (around 8/18/2017).    Thank you so much for allowing me to participate in the care of Idalia Hooper  RENO Pina

## 2017-07-10 NOTE — INTERVAL H&P NOTE
The patient has been examined and the H&P has been reviewed:    I concur with the findings and no changes have occurred since H&P was written.    Anesthesia/Surgery risks, benefits and alternative options discussed and understood by patient/family.          Active Hospital Problems    Diagnosis  POA    GERD (gastroesophageal reflux disease) [K21.9]  Yes      Resolved Hospital Problems    Diagnosis Date Resolved POA   No resolved problems to display.     Proceed with EGD for GERD.

## 2017-07-10 NOTE — ANESTHESIA POSTPROCEDURE EVALUATION
"Anesthesia Post Evaluation    Patient: Idalia Hooper    Procedure(s) Performed: Procedure(s) (LRB):  ESOPHAGOGASTRODUODENOSCOPY (EGD) (N/A)    Final Anesthesia Type: MAC  Patient location during evaluation: GI PACU  Patient participation: Yes- Able to Participate  Level of consciousness: awake and alert and oriented  Post-procedure vital signs: reviewed and stable  Pain management: adequate  Airway patency: patent  PONV status at discharge: No PONV  Anesthetic complications: no      Cardiovascular status: blood pressure returned to baseline  Respiratory status: unassisted, room air and spontaneous ventilation  Hydration status: euvolemic  Follow-up not needed.        Visit Vitals  /68 (BP Location: Left arm, Patient Position: Lying, BP Method: Automatic)   Pulse 65   Temp 36.5 °C (97.7 °F) (Oral)   Resp 18   Ht 5' 8" (1.727 m)   Wt 70.3 kg (155 lb)   LMP 06/28/2017 (Approximate)   SpO2 98%   Breastfeeding? No   BMI 23.57 kg/m²       Pain/Michelle Score: Pain Assessment Performed: Yes (7/10/2017  3:30 PM)  Presence of Pain: denies (7/10/2017  3:30 PM)  Michelle Score: 9 (7/10/2017  3:30 PM)      "

## 2017-07-10 NOTE — ANESTHESIA RELEASE NOTE
"Anesthesia Release from PACU Note    Patient: Idalia Hooper    Procedure(s) Performed: Procedure(s) (LRB):  ESOPHAGOGASTRODUODENOSCOPY (EGD) (N/A)    Anesthesia type: MAC    Post pain: Adequate analgesia    Post assessment: no apparent anesthetic complications, tolerated procedure well and no evidence of recall    Last Vitals:   Visit Vitals  /68 (BP Location: Left arm, Patient Position: Lying, BP Method: Automatic)   Pulse 65   Temp 36.5 °C (97.7 °F) (Oral)   Resp 18   Ht 5' 8" (1.727 m)   Wt 70.3 kg (155 lb)   LMP 06/28/2017 (Approximate)   SpO2 98%   Breastfeeding? No   BMI 23.57 kg/m²       Post vital signs: stable    Level of consciousness: awake, alert  and oriented    Nausea/Vomiting: no nausea/no vomiting    Complications: none    Airway Patency: patent    Respiratory: unassisted, spontaneous ventilation, room air    Cardiovascular: stable and blood pressure at baseline    Hydration: euvolemic  "

## 2017-07-10 NOTE — DISCHARGE INSTRUCTIONS
Gastritis (Adult)    Gastritis is inflammation and irritation of the stomach lining. It can be present for a short time (acute) or be long lasting (chronic). Gastritis is often caused by infection with bacteria called H pylori. More than a third of people in the [place] have this bacteria in their bodies. In many cases, H pylori causes no problems or symptoms. In some people, though, the infection irritates the stomach lining and causes gastritis. Other causes of stomach irritation include drinking alcohol or taking pain-relieving medicines called NSAIDs (such as aspirin or ibuprofen).   Symptoms of gastritis can include:  · Abdominal pain or bloating  · Loss of appetite  · Nausea or vomiting  · Vomiting blood or having black stools  · Feeling more tired than usual  An inflamed and irritated stomach lining is more likely to develop a sore called an ulcer. To help prevent this, gastritis should be treated.  Home care  If needed, medicines may be prescribed. If you have H pylori infection, treating it will likely relieve your symptoms. Other changes can help reduce stomach irritation and help it heal.  · If you have been prescribed medicines for H pylori infection, take them as directed. Take all of the medicine until it is finished or your healthcare provider tells you to stop, even if you feel better.  · Your healthcare provider may recommend avoiding NSAIDs. If you take daily aspirin for your heart or other medical reasons, do not stop without talking to your healthcare provider first.  · Avoid drinking alcohol.  · Stop smoking. Smoking can irritate the stomach and delay healing. As much as possible, stay away from second hand smoke.  Follow-up care  Follow up with your healthcare provider, or as advised by our staff. Testing may be needed to check for inflammation or an ulcer.  When to seek medical advice  Call your healthcare provider for any of the following:  · Stomach pain that gets worse or moves to the lower  right abdomen (appendix area)  · Chest pain that appears or gets worse, or spreads to the back, neck, shoulder, or arm  · Frequent vomiting (cant keep down liquids)  · Blood in the stool or vomit (red or black in color)  · Feeling weak or dizzy  · Fever of 100.4ºF (38ºC) or higher, or as directed by your healthcare provider  Date Last Reviewed: 6/22/2015  © 7631-7278 [employer]. [address] [place] [address]. All rights reserved. This information is not intended as a substitute for professional medical care. Always follow your healthcare professional's instructions.

## 2017-07-10 NOTE — TRANSFER OF CARE
"Anesthesia Transfer of Care Note    Patient: Idalia Hooper    Procedure(s) Performed: Procedure(s) (LRB):  ESOPHAGOGASTRODUODENOSCOPY (EGD) (N/A)    Patient location: GI    Anesthesia Type: MAC    Transport from OR: Transported from OR on room air with adequate spontaneous ventilation    Post pain: adequate analgesia    Post assessment: no apparent anesthetic complications    Post vital signs: stable    Level of consciousness: awake, alert and oriented    Nausea/Vomiting: no nausea/vomiting    Complications: none    Transfer of care protocol was followed      Last vitals:   Visit Vitals  /78 (BP Location: Left arm, Patient Position: Lying, BP Method: Manual)   Pulse 67   Temp 36.5 °C (97.7 °F) (Oral)   Resp 20   Ht 5' 8" (1.727 m)   Wt 70.3 kg (155 lb)   LMP 06/28/2017 (Approximate)   SpO2 98%   Breastfeeding? No   BMI 23.57 kg/m²     "

## 2017-08-18 ENCOUNTER — OFFICE VISIT (OUTPATIENT)
Dept: GASTROENTEROLOGY | Facility: CLINIC | Age: 27
End: 2017-08-18
Payer: OTHER GOVERNMENT

## 2017-08-18 VITALS
SYSTOLIC BLOOD PRESSURE: 112 MMHG | WEIGHT: 160.94 LBS | HEART RATE: 84 BPM | DIASTOLIC BLOOD PRESSURE: 60 MMHG | HEIGHT: 68 IN | BODY MASS INDEX: 24.39 KG/M2

## 2017-08-18 DIAGNOSIS — R10.13 EPIGASTRIC PAIN: Primary | ICD-10-CM

## 2017-08-18 DIAGNOSIS — K29.70 GASTRITIS WITHOUT BLEEDING, UNSPECIFIED CHRONICITY, UNSPECIFIED GASTRITIS TYPE: ICD-10-CM

## 2017-08-18 PROCEDURE — 99213 OFFICE O/P EST LOW 20 MIN: CPT | Mod: PBBFAC | Performed by: NURSE PRACTITIONER

## 2017-08-18 PROCEDURE — 3008F BODY MASS INDEX DOCD: CPT | Mod: ,,, | Performed by: NURSE PRACTITIONER

## 2017-08-18 PROCEDURE — 99999 PR PBB SHADOW E&M-EST. PATIENT-LVL III: CPT | Mod: PBBFAC,,, | Performed by: NURSE PRACTITIONER

## 2017-08-18 PROCEDURE — 99213 OFFICE O/P EST LOW 20 MIN: CPT | Mod: S$PBB,,, | Performed by: NURSE PRACTITIONER

## 2017-08-18 RX ORDER — PANTOPRAZOLE SODIUM 40 MG/1
40 TABLET, DELAYED RELEASE ORAL 2 TIMES DAILY
Qty: 60 TABLET | Refills: 1 | Status: SHIPPED | OUTPATIENT
Start: 2017-08-18 | End: 2017-08-18 | Stop reason: SDUPTHER

## 2017-08-18 RX ORDER — PANTOPRAZOLE SODIUM 40 MG/1
40 TABLET, DELAYED RELEASE ORAL 2 TIMES DAILY
Qty: 60 TABLET | Refills: 1 | Status: SHIPPED | OUTPATIENT
Start: 2017-08-18 | End: 2018-02-04 | Stop reason: SDUPTHER
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
## 2017-09-01 DIAGNOSIS — N76.6 GENITAL ULCER, FEMALE: ICD-10-CM

## 2017-09-02 ENCOUNTER — PATIENT MESSAGE (OUTPATIENT)
Dept: OBSTETRICS AND GYNECOLOGY | Facility: CLINIC | Age: 27
End: 2017-09-02

## 2017-09-05 RX ORDER — VALACYCLOVIR HYDROCHLORIDE 1 G/1
TABLET, FILM COATED ORAL
Qty: 5 TABLET | Refills: 3 | Status: SHIPPED | OUTPATIENT
Start: 2017-09-05 | End: 2018-04-25 | Stop reason: SDUPTHER

## 2017-12-11 ENCOUNTER — OFFICE VISIT (OUTPATIENT)
Dept: URGENT CARE | Facility: CLINIC | Age: 27
End: 2017-12-11
Payer: OTHER GOVERNMENT

## 2017-12-11 VITALS
BODY MASS INDEX: 26.3 KG/M2 | OXYGEN SATURATION: 99 % | WEIGHT: 173.5 LBS | TEMPERATURE: 99 F | DIASTOLIC BLOOD PRESSURE: 78 MMHG | HEIGHT: 68 IN | SYSTOLIC BLOOD PRESSURE: 120 MMHG | HEART RATE: 83 BPM

## 2017-12-11 DIAGNOSIS — R68.89 FLU-LIKE SYMPTOMS: Primary | ICD-10-CM

## 2017-12-11 PROCEDURE — 99999 PR PBB SHADOW E&M-EST. PATIENT-LVL III: CPT | Mod: PBBFAC,,, | Performed by: NURSE PRACTITIONER

## 2017-12-11 PROCEDURE — 99214 OFFICE O/P EST MOD 30 MIN: CPT | Mod: S$PBB,,, | Performed by: NURSE PRACTITIONER

## 2017-12-11 PROCEDURE — 99213 OFFICE O/P EST LOW 20 MIN: CPT | Mod: PBBFAC,PO | Performed by: NURSE PRACTITIONER

## 2017-12-11 RX ORDER — OSELTAMIVIR PHOSPHATE 75 MG/1
75 CAPSULE ORAL 2 TIMES DAILY
Qty: 10 CAPSULE | Refills: 0 | Status: SHIPPED | OUTPATIENT
Start: 2017-12-11 | End: 2017-12-16

## 2017-12-11 RX ORDER — BENZONATATE 100 MG/1
200 CAPSULE ORAL 3 TIMES DAILY PRN
Qty: 60 CAPSULE | Refills: 0 | Status: SHIPPED | OUTPATIENT
Start: 2017-12-11 | End: 2017-12-21

## 2017-12-11 NOTE — PATIENT INSTRUCTIONS

## 2017-12-11 NOTE — PROGRESS NOTES
Subjective:       Patient ID: Idalia Hooper is a 27 y.o. female.    Chief Complaint: Nasal Congestion    Influenza   This is a new problem. The current episode started in the past 7 days (2 days). The problem has been gradually worsening. Associated symptoms include chills, congestion, coughing, a fever (unmeasured), headaches and myalgias. Pertinent negatives include no chest pain, diaphoresis, fatigue or sore throat.     Review of Systems   Constitutional: Positive for chills and fever (unmeasured). Negative for diaphoresis and fatigue.   HENT: Positive for congestion, rhinorrhea, sinus pain and sinus pressure. Negative for ear discharge, ear pain, postnasal drip, sneezing and sore throat.    Respiratory: Positive for cough. Negative for shortness of breath and wheezing.    Cardiovascular: Negative for chest pain and palpitations.   Musculoskeletal: Positive for myalgias. Negative for back pain.   Neurological: Positive for headaches.       Objective:      Physical Exam   Constitutional: She is oriented to person, place, and time. She appears well-developed and well-nourished. No distress.   HENT:   Head: Normocephalic.   Right Ear: Tympanic membrane, external ear and ear canal normal.   Left Ear: Tympanic membrane, external ear and ear canal normal.   Nose: Mucosal edema present. No rhinorrhea. Right sinus exhibits maxillary sinus tenderness and frontal sinus tenderness. Left sinus exhibits maxillary sinus tenderness and frontal sinus tenderness.   Mouth/Throat: Uvula is midline, oropharynx is clear and moist and mucous membranes are normal. No oropharyngeal exudate, posterior oropharyngeal edema or posterior oropharyngeal erythema.   Eyes: Conjunctivae and EOM are normal.   Neck: Normal range of motion. Neck supple.   Cardiovascular: Normal rate, regular rhythm and normal heart sounds.    Pulmonary/Chest: Effort normal and breath sounds normal. No accessory muscle usage. No apnea, no tachypnea and no  bradypnea. No respiratory distress. She has no decreased breath sounds. She has no wheezes. She has no rhonchi. She has no rales.   Lymphadenopathy:        Head (right side): No submental, no submandibular and no tonsillar adenopathy present.        Head (left side): No submental, no submandibular and no tonsillar adenopathy present.     She has no cervical adenopathy.   Neurological: She is alert and oriented to person, place, and time.   Skin: Skin is warm and dry. She is not diaphoretic.       Assessment:       1. Flu-like symptoms        Plan:   Idalia was seen today for nasal congestion.    Diagnoses and all orders for this visit:    Flu-like symptoms  -     oseltamivir (TAMIFLU) 75 MG capsule; Take 1 capsule (75 mg total) by mouth 2 (two) times daily.  -     benzonatate (TESSALON) 100 MG capsule; Take 2 capsules (200 mg total) by mouth 3 (three) times daily as needed for Cough.    -     Diagnosis and treatment discussed, AVS provided  -     Follow up with PCP or ER immediately for worsening, new or no improvement of symptoms.   -     Patient understands and agrees with plan

## 2017-12-11 NOTE — LETTER
December 11, 2017      [facility]  [address]  [facility] [address]  Phone: [phone]  Fax: [phone]       Patient: Idalia Hooper   YOB: 1990  Date of Visit: 12/11/2017    To Whom It May Concern:    Gregory Hooper  was at [facility] on 12/11/2017. She may return to work/school on 12/14/17 with no restrictions. If you have any questions or concerns, or if I can be of further assistance, please do not hesitate to contact me.    Sincerely,    Ginette Reyes NP

## 2018-02-05 RX ORDER — PANTOPRAZOLE SODIUM 40 MG/1
40 TABLET, DELAYED RELEASE ORAL DAILY
Qty: 30 TABLET | Refills: 5 | Status: SHIPPED | OUTPATIENT
Start: 2018-02-05 | End: 2018-10-16

## 2018-02-05 NOTE — TELEPHONE ENCOUNTER
Received refill request for pantoprazole twice a day. She should now be taking the medication once a day. I will send in for once a day dosing with refills.

## 2018-03-13 ENCOUNTER — OFFICE VISIT (OUTPATIENT)
Dept: INTERNAL MEDICINE | Facility: CLINIC | Age: 28
End: 2018-03-13
Payer: OTHER GOVERNMENT

## 2018-03-13 VITALS
SYSTOLIC BLOOD PRESSURE: 118 MMHG | OXYGEN SATURATION: 98 % | HEIGHT: 68 IN | TEMPERATURE: 97 F | WEIGHT: 173.06 LBS | HEART RATE: 64 BPM | BODY MASS INDEX: 26.23 KG/M2 | DIASTOLIC BLOOD PRESSURE: 70 MMHG

## 2018-03-13 DIAGNOSIS — M25.531 RIGHT WRIST PAIN: Primary | ICD-10-CM

## 2018-03-13 PROCEDURE — 99214 OFFICE O/P EST MOD 30 MIN: CPT | Mod: S$PBB,,, | Performed by: NURSE PRACTITIONER

## 2018-03-13 PROCEDURE — 99214 OFFICE O/P EST MOD 30 MIN: CPT | Mod: PBBFAC | Performed by: NURSE PRACTITIONER

## 2018-03-13 PROCEDURE — 99999 PR PBB SHADOW E&M-EST. PATIENT-LVL IV: CPT | Mod: PBBFAC,,, | Performed by: NURSE PRACTITIONER

## 2018-03-13 RX ORDER — ESTRADIOL 0.1 MG/G
CREAM VAGINAL DAILY
COMMUNITY
End: 2018-08-01

## 2018-03-13 NOTE — PROGRESS NOTES
Subjective:       Patient ID: Idalia Hooper is a 27 y.o. female.    Chief Complaint: Wrist Pain (pain since November)    Patient presents with right wrist pain that began several months ago and has gotten progressively worse. 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 She now works at [employer] in the [employer] area. She wears a wrist brace but cannot wear it at work due to hygiene concerns. She cannot take NSAIDs due to history of gastric ulcers.      Wrist Pain    The pain is present in the right wrist. This is a new problem. The current episode started more than 1 month ago (since november). The problem occurs daily. The problem has been gradually worsening. The quality of the pain is described as aching. The pain is severe. Associated symptoms include an inability to bear weight, numbness and tingling (occasionally). Pertinent negatives include no fever. Associated symptoms comments: Decreased  strength. Exacerbated by: lifting, pressure to wrist. She has tried acetaminophen (brace) for the symptoms. The treatment provided mild relief.     Review of Systems   Constitutional: Negative for chills and fever.   Musculoskeletal: Positive for arthralgias (right wrist). Negative for joint swelling and neck pain.   Skin: Negative for rash and wound.   Neurological: Positive for tingling (occasionally), weakness (right hand) and numbness.       Objective:      Physical Exam   Constitutional: She appears well-developed and well-nourished. No distress.   HENT:   Head: Normocephalic and atraumatic.   Musculoskeletal:        Right wrist: She exhibits decreased range of motion and tenderness. She exhibits no bony tenderness, no swelling, no effusion, no crepitus, no deformity and no laceration.   Decreased  strength in right wrist. Pulse normal.   Neurological: She is alert.   Skin: She is not diaphoretic.   Psychiatric: She has a normal mood  and affect.   Vitals reviewed.      Assessment:       1. Right wrist pain        Plan:   Right wrist pain  -     Ambulatory Referral to Orthopedics      Continue tylenol and brace. Pain most likely carpal tunnel. Discussed with patient.    25 minutes spent on this visit, with greater than 50% of the time spent on discussion of diagnosis and treatment/coordination of care as documented above.    Follow-up if symptoms worsen or fail to improve, for keep routine follow up.

## 2018-03-15 ENCOUNTER — TELEPHONE (OUTPATIENT)
Dept: ORTHOPEDICS | Facility: CLINIC | Age: 28
End: 2018-03-15

## 2018-03-15 NOTE — TELEPHONE ENCOUNTER
Left vm for this patient to call the office back and have her appt moved to be seen by Tessie Munoz Pa-c, Dr. Cancino's physician assistant

## 2018-03-19 ENCOUNTER — TELEPHONE (OUTPATIENT)
Dept: ORTHOPEDICS | Facility: CLINIC | Age: 28
End: 2018-03-19

## 2018-03-19 NOTE — TELEPHONE ENCOUNTER
I have spoken with Malachi/ Clinic Manager over the orthopedics department to have this patietnt scheduled with Dr. Peacock. I have contacted the patient to have her schedule at her choice. The patient states she was at work at the time and will call back at a later date to schedule. -AS

## 2018-04-25 DIAGNOSIS — N76.6 GENITAL ULCER, FEMALE: ICD-10-CM

## 2018-04-25 RX ORDER — VALACYCLOVIR HYDROCHLORIDE 1 G/1
1000 TABLET, FILM COATED ORAL DAILY
Qty: 5 TABLET | Refills: 0 | Status: SHIPPED | OUTPATIENT
Start: 2018-04-25 | End: 2018-09-18 | Stop reason: SDUPTHER

## 2018-06-12 ENCOUNTER — HOSPITAL ENCOUNTER (EMERGENCY)
Facility: HOSPITAL | Age: 28
Discharge: HOME | End: 2018-06-12
Payer: COMMERCIAL

## 2018-06-12 VITALS
HEIGHT: 68 IN | HEART RATE: 84 BPM | WEIGHT: 161 LBS | RESPIRATION RATE: 18 BRPM | DIASTOLIC BLOOD PRESSURE: 63 MMHG | BODY MASS INDEX: 24.4 KG/M2 | SYSTOLIC BLOOD PRESSURE: 142 MMHG | TEMPERATURE: 99 F | OXYGEN SATURATION: 98 %
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
## 2018-06-12 DIAGNOSIS — S70.01XA CONTUSION OF RIGHT HIP, INITIAL ENCOUNTER: Primary | ICD-10-CM

## 2018-06-12 LAB — B-HCG UR QL: NEGATIVE

## 2018-06-12 PROCEDURE — 99284 EMERGENCY DEPT VISIT MOD MDM: CPT

## 2018-06-12 PROCEDURE — 81025 URINE PREGNANCY TEST: CPT

## 2018-06-12 PROCEDURE — 25000003 PHARM REV CODE 250: Performed by: PHYSICIAN ASSISTANT

## 2018-06-12 RX ORDER — HYDROCODONE BITARTRATE AND ACETAMINOPHEN 7.5; 325 MG/1; MG/1
1 TABLET ORAL
Status: COMPLETED | OUTPATIENT
Start: 2018-06-12 | End: 2018-06-12

## 2018-06-12 RX ORDER — CYCLOBENZAPRINE HCL 10 MG
5 TABLET ORAL 3 TIMES DAILY PRN
Qty: 15 TABLET | Refills: 0 | Status: SHIPPED | OUTPATIENT
Start: 2018-06-12 | End: 2018-06-17

## 2018-06-12 RX ADMIN — HYDROCODONE BITARTRATE AND ACETAMINOPHEN 1 TABLET: 7.5; 325 TABLET ORAL at 08:06

## 2018-06-13 ENCOUNTER — PES CALL (OUTPATIENT)
Dept: ADMINISTRATIVE | Facility: CLINIC | Age: 28
End: 2018-06-13
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
## 2018-08-01 ENCOUNTER — OFFICE VISIT (OUTPATIENT)
Dept: INTERNAL MEDICINE | Facility: CLINIC | Age: 28
End: 2018-08-01
Payer: OTHER GOVERNMENT

## 2018-08-01 VITALS
DIASTOLIC BLOOD PRESSURE: 60 MMHG | BODY MASS INDEX: 26.4 KG/M2 | HEART RATE: 93 BPM | WEIGHT: 168.19 LBS | SYSTOLIC BLOOD PRESSURE: 126 MMHG | HEIGHT: 67 IN | RESPIRATION RATE: 20 BRPM | TEMPERATURE: 98 F | OXYGEN SATURATION: 99 %

## 2018-08-01 DIAGNOSIS — J45.20 MILD INTERMITTENT ASTHMA WITHOUT COMPLICATION: Primary | ICD-10-CM

## 2018-08-01 DIAGNOSIS — M54.50 ACUTE MIDLINE LOW BACK PAIN WITHOUT SCIATICA: ICD-10-CM

## 2018-08-01 PROCEDURE — 99999 PR PBB SHADOW E&M-EST. PATIENT-LVL III: CPT | Mod: PBBFAC,,, | Performed by: FAMILY MEDICINE

## 2018-08-01 PROCEDURE — 99213 OFFICE O/P EST LOW 20 MIN: CPT | Mod: PBBFAC | Performed by: FAMILY MEDICINE

## 2018-08-01 PROCEDURE — 99214 OFFICE O/P EST MOD 30 MIN: CPT | Mod: S$PBB,,, | Performed by: FAMILY MEDICINE

## 2018-08-01 RX ORDER — ALBUTEROL SULFATE 90 UG/1
2 AEROSOL, METERED RESPIRATORY (INHALATION) EVERY 4 HOURS PRN
Qty: 18 G | Refills: 2 | Status: SHIPPED | OUTPATIENT
Start: 2018-08-01 | End: 2024-03-05 | Stop reason: SDUPTHER

## 2018-08-01 NOTE — ASSESSMENT & PLAN NOTE
Advised her treatment options are limited due to pregnancy, tylenol is okay to take prn, advised to discuss possibility of PT with her OB/GYN

## 2018-08-01 NOTE — PROGRESS NOTES
Subjective:       Patient ID: Idalia Hooper is a 27 y.o. female.    Chief Complaint: Asthma (med refill on albuterol inhaler)    Patient presents to clinic today for refill of albuterol inhaler to use as needed for asthma.  Patient is recently pregnant.  She reports she has discussed this with her OBGYN, Dr. Roxane Awan and she advises it is okay for patient to take albuterol during pregnancy.  Patient also reports she was seen in the emergency room for back injury in June.  She reports she is still having some low back pain.      Review of Systems   Constitutional: Negative for chills, fatigue, fever and unexpected weight change.   Eyes: Negative for visual disturbance.   Respiratory: Positive for shortness of breath (intermittent due to asthma).    Cardiovascular: Negative for chest pain.   Musculoskeletal: Positive for back pain. Negative for myalgias.   Neurological: Negative for headaches.       Objective:      Physical Exam   Constitutional: She is oriented to person, place, and time. She appears well-developed and well-nourished. No distress.   HENT:   Head: Normocephalic and atraumatic.   Eyes: Conjunctivae and EOM are normal. Pupils are equal, round, and reactive to light. No scleral icterus.   Cardiovascular: Normal rate and regular rhythm.  Exam reveals no gallop and no friction rub.    No murmur heard.  Pulmonary/Chest: Effort normal and breath sounds normal.   Neurological: She is alert and oriented to person, place, and time. No cranial nerve deficit. Gait normal.   Psychiatric: She has a normal mood and affect.   Vitals reviewed.      Assessment:       1. Mild intermittent asthma without complication    2. Acute midline low back pain without sciatica        Plan:     Problem List Items Addressed This Visit     Mild intermittent asthma without complication - Primary    Current Assessment & Plan     Albuterol refilled per request, patient states okay per Dr. Awan, advised pregnancy category  C, patient expressed understanding         Relevant Medications    albuterol 90 mcg/actuation inhaler    Acute midline low back pain without sciatica    Current Assessment & Plan     Advised her treatment options are limited due to pregnancy, tylenol is okay to take prn, advised to discuss possibility of PT with her OB/GYN

## 2018-08-01 NOTE — ASSESSMENT & PLAN NOTE
Albuterol refilled per request, patient states okay per Dr. Awan, advised pregnancy category C, patient expressed understanding

## 2018-08-20 ENCOUNTER — PATIENT MESSAGE (OUTPATIENT)
Dept: INTERNAL MEDICINE | Facility: CLINIC | Age: 28
End: 2018-08-20

## 2018-09-18 DIAGNOSIS — N76.6 GENITAL ULCER, FEMALE: ICD-10-CM

## 2018-09-18 RX ORDER — VALACYCLOVIR HYDROCHLORIDE 1 G/1
1000 TABLET, FILM COATED ORAL DAILY
Qty: 5 TABLET | Refills: 0 | Status: SHIPPED | OUTPATIENT
Start: 2018-09-18 | End: 2022-02-03

## 2018-10-15 DIAGNOSIS — N76.6 GENITAL ULCER, FEMALE: ICD-10-CM

## 2018-10-16 ENCOUNTER — TELEPHONE (OUTPATIENT)
Dept: URGENT CARE | Facility: CLINIC | Age: 28
End: 2018-10-16

## 2018-10-16 ENCOUNTER — OFFICE VISIT (OUTPATIENT)
Dept: URGENT CARE | Facility: CLINIC | Age: 28
End: 2018-10-16
Payer: OTHER GOVERNMENT

## 2018-10-16 VITALS
HEART RATE: 72 BPM | BODY MASS INDEX: 25.01 KG/M2 | RESPIRATION RATE: 18 BRPM | WEIGHT: 165 LBS | TEMPERATURE: 98 F | SYSTOLIC BLOOD PRESSURE: 120 MMHG | DIASTOLIC BLOOD PRESSURE: 74 MMHG | HEIGHT: 68 IN | OXYGEN SATURATION: 99 %

## 2018-10-16 DIAGNOSIS — K12.0 APHTHOUS ULCER: Primary | ICD-10-CM

## 2018-10-16 PROCEDURE — 99214 OFFICE O/P EST MOD 30 MIN: CPT | Mod: S$PBB,,, | Performed by: NURSE PRACTITIONER

## 2018-10-16 PROCEDURE — 99999 PR PBB SHADOW E&M-EST. PATIENT-LVL IV: CPT | Mod: PBBFAC,,, | Performed by: NURSE PRACTITIONER

## 2018-10-16 PROCEDURE — 99214 OFFICE O/P EST MOD 30 MIN: CPT | Mod: PBBFAC,PO | Performed by: NURSE PRACTITIONER

## 2018-10-16 RX ORDER — VALACYCLOVIR HYDROCHLORIDE 1 G/1
TABLET, FILM COATED ORAL
Qty: 5 TABLET | Refills: 0 | OUTPATIENT
Start: 2018-10-16

## 2018-10-16 NOTE — TELEPHONE ENCOUNTER
Spoke with pharmacist would like to know if they can change the maalox to mylanta. Informed pharmacist that message will be sent to ms burton.

## 2018-10-16 NOTE — LETTER
[facility] - [facility]  Urgent Care  [address]  [place] [address]  Phone: [phone]  Fax: [phone] October 16, 2018    Patient: Idalia Hooper   Patient ID [id number]   YOB: 1990   Date of Visit: 10/16/2018       To Whom It May Concern:    Idalia Hooper was seen and treated in our urgent care department on 10/16/2018. She may return to work on October 17, 2018.    Sincerely,       Marialuisa Dela Cruz NP

## 2018-10-16 NOTE — TELEPHONE ENCOUNTER
----- Message from Shahnaz Dalton sent at 10/16/2018  3:30 PM CDT -----  Contact: [facility] Qgnzztve-[phone]  Would like to consult with nurse regarding prescription for mouthwash. Please call back a [phone]. Md Alexandrea
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
## 2018-10-16 NOTE — PATIENT INSTRUCTIONS
PLAN:   Advise increase p.o. fluids-- water/juice & rest  Meds:  Magic mouthwash  Simply saline nasal wash to irrigate sinuses and for congestion/runny nose.  Cool mist humidifier/vaporizer.  Practice good handwashing.  Tylenol  for fever, headache and body aches.  Warm salt water gargles for throat comfort.  Advise follow up with PCP  Advise go to ER if symptoms worsen or fail to improve with treatment.  AVS provided and reviewed with patient including supportive care, follow up, and red flag symptoms.   Patient verbalizes understanding and agrees with treatment plan. Discharged from Urgent Care in stable condition.  Your symptoms are likely due to a viral infection.    Given work excuse for 1 day

## 2018-10-16 NOTE — TELEPHONE ENCOUNTER
Spoke with pharmacist stated that pt did not want to wait on call back. Script was sent to [facility] per [place] pharmacist. Informed her sorry. Pharmacist stated understanding. Thanks for calling back.

## 2018-10-18 PROCEDURE — 99283 EMERGENCY DEPT VISIT LOW MDM: CPT | Mod: 25

## 2018-10-18 PROCEDURE — 93005 ELECTROCARDIOGRAM TRACING: CPT

## 2018-10-19 ENCOUNTER — HOSPITAL ENCOUNTER (EMERGENCY)
Facility: HOSPITAL | Age: 28
Discharge: HOME | End: 2018-10-19
Attending: EMERGENCY MEDICINE
Payer: OTHER GOVERNMENT

## 2018-10-19 VITALS
RESPIRATION RATE: 16 BRPM | DIASTOLIC BLOOD PRESSURE: 73 MMHG | TEMPERATURE: 99 F | BODY MASS INDEX: 25.09 KG/M2 | HEIGHT: 68 IN | SYSTOLIC BLOOD PRESSURE: 129 MMHG | HEART RATE: 93 BPM | OXYGEN SATURATION: 96 %

## 2018-10-19 DIAGNOSIS — F41.0 PANIC ATTACK: Primary | ICD-10-CM

## 2018-10-19 DIAGNOSIS — R00.2 PALPITATIONS: ICD-10-CM

## 2018-10-19 PROCEDURE — 93010 ELECTROCARDIOGRAM REPORT: CPT | Mod: ,,, | Performed by: INTERNAL MEDICINE

## 2018-10-19 PROCEDURE — 25000003 PHARM REV CODE 250: Performed by: EMERGENCY MEDICINE

## 2018-10-19 RX ORDER — HYDROXYZINE PAMOATE 25 MG/1
25 CAPSULE ORAL
Status: COMPLETED | OUTPATIENT
Start: 2018-10-19 | End: 2018-10-19

## 2018-10-19 RX ORDER — HYDROXYZINE PAMOATE 25 MG/1
25 CAPSULE ORAL EVERY 6 HOURS PRN
Qty: 12 CAPSULE | Refills: 0 | Status: SHIPPED | OUTPATIENT
Start: 2018-10-19 | End: 2022-02-03

## 2018-10-19 RX ORDER — ACETAMINOPHEN 500 MG
1000 TABLET ORAL
Status: COMPLETED | OUTPATIENT
Start: 2018-10-19 | End: 2018-10-19

## 2018-10-19 RX ADMIN — ACETAMINOPHEN 1000 MG: 500 TABLET, FILM COATED ORAL at 01:10

## 2018-10-19 RX ADMIN — HYDROXYZINE PAMOATE 25 MG: 25 CAPSULE ORAL at 01:10
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
## 2018-12-05 ENCOUNTER — OFFICE VISIT (OUTPATIENT)
Dept: INTERNAL MEDICINE | Facility: CLINIC | Age: 28
End: 2018-12-05
Payer: OTHER GOVERNMENT

## 2018-12-05 ENCOUNTER — PATIENT MESSAGE (OUTPATIENT)
Dept: INTERNAL MEDICINE | Facility: CLINIC | Age: 28
End: 2018-12-05

## 2018-12-05 VITALS
BODY MASS INDEX: 25.42 KG/M2 | TEMPERATURE: 98 F | OXYGEN SATURATION: 98 % | HEIGHT: 68 IN | HEART RATE: 99 BPM | SYSTOLIC BLOOD PRESSURE: 130 MMHG | WEIGHT: 167.75 LBS | DIASTOLIC BLOOD PRESSURE: 68 MMHG

## 2018-12-05 DIAGNOSIS — F41.9 ANXIETY: ICD-10-CM

## 2018-12-05 DIAGNOSIS — M54.41 MIDLINE LOW BACK PAIN WITH RIGHT-SIDED SCIATICA, UNSPECIFIED CHRONICITY: ICD-10-CM

## 2018-12-05 DIAGNOSIS — F43.10 PTSD (POST-TRAUMATIC STRESS DISORDER): ICD-10-CM

## 2018-12-05 DIAGNOSIS — M53.3 SACROILIAC PAIN: Primary | ICD-10-CM

## 2018-12-05 DIAGNOSIS — F41.9 ANXIETY: Primary | ICD-10-CM

## 2018-12-05 PROCEDURE — 99999 PR PBB SHADOW E&M-EST. PATIENT-LVL V: CPT | Mod: PBBFAC,,, | Performed by: NURSE PRACTITIONER

## 2018-12-05 PROCEDURE — 99215 OFFICE O/P EST HI 40 MIN: CPT | Mod: PBBFAC | Performed by: NURSE PRACTITIONER

## 2018-12-05 PROCEDURE — 99214 OFFICE O/P EST MOD 30 MIN: CPT | Mod: S$PBB,,, | Performed by: NURSE PRACTITIONER

## 2018-12-05 RX ORDER — METHYLPREDNISOLONE 4 MG/1
TABLET ORAL
Qty: 1 PACKAGE | Refills: 0 | Status: SHIPPED | OUTPATIENT
Start: 2018-12-05 | End: 2018-12-26

## 2018-12-05 NOTE — PROGRESS NOTES
"Subjective:       Patient ID: Idalia Hooper is a 28 y.o. female.    Chief Complaint: Back Pain    Patient presents for back pain that began after fall at work in June on right sciatic nerve  Worse with BM straining     She also requests psych referral. She has history of PTSD, anxiety, depression.       Back Pain   This is a new problem. The current episode started more than 1 month ago. The problem occurs intermittently. The problem has been waxing and waning since onset. The pain is present in the sacro-iliac. The quality of the pain is described as stabbing. The pain does not radiate. The pain is moderate. The symptoms are aggravated by lying down. Associated symptoms include numbness (rare). Pertinent negatives include no bladder incontinence, bowel incontinence, fever, pelvic pain, perianal numbness or weakness. She has tried muscle relaxant and heat (tylenol, icy hot) for the symptoms. The treatment provided mild relief.     Review of Systems   Constitutional: Negative for chills, fever and unexpected weight change.   Gastrointestinal: Negative for bowel incontinence.   Genitourinary: Negative for bladder incontinence and pelvic pain.        Denies urinary incontinence   Musculoskeletal: Positive for back pain.   Neurological: Positive for numbness (rare). Negative for weakness.   Psychiatric/Behavioral: Positive for dysphoric mood. Negative for suicidal ideas (has a history of SI and states she is "not there yet"; family aware of her depression). The patient is nervous/anxious.        Objective:      Physical Exam   Constitutional: She appears well-developed and well-nourished.   Cardiovascular: Normal rate and regular rhythm.   Pulmonary/Chest: Effort normal and breath sounds normal.   Musculoskeletal:   Lumbar paraspinous muscles nontender to palpation.   postive straight leg right    Psychiatric: She exhibits a depressed mood.   Vitals reviewed.      Assessment:       1. Sacroiliac pain    2. Midline " low back pain with right-sided sciatica, unspecified chronicity    3. Anxiety    4. PTSD (post-traumatic stress disorder)        Plan:   Sacroiliac pain  -     Ambulatory Referral to Physical/Occupational Therapy  -     methylPREDNISolone (MEDROL DOSEPACK) 4 mg tablet; use as directed  Dispense: 1 Package; Refill: 0    Midline low back pain with right-sided sciatica, unspecified chronicity  -     Ambulatory Referral to Physical/Occupational Therapy  -     methylPREDNISolone (MEDROL DOSEPACK) 4 mg tablet; use as directed  Dispense: 1 Package; Refill: 0    Anxiety  -     Ambulatory Referral to Psychiatry    PTSD (post-traumatic stress disorder)  -     Ambulatory Referral to Psychiatry        Follow-up if symptoms worsen or fail to improve, for keep routine follow up.

## 2018-12-10 ENCOUNTER — PATIENT MESSAGE (OUTPATIENT)
Dept: INTERNAL MEDICINE | Facility: CLINIC | Age: 28
End: 2018-12-10

## 2018-12-10 DIAGNOSIS — F41.9 ANXIETY: ICD-10-CM

## 2018-12-10 RX ORDER — BUSPIRONE HYDROCHLORIDE 5 MG/1
5 TABLET ORAL 2 TIMES DAILY
Qty: 60 TABLET | Refills: 11 | Status: SHIPPED | OUTPATIENT
Start: 2018-12-10 | End: 2018-12-10 | Stop reason: SDUPTHER

## 2018-12-10 RX ORDER — BUSPIRONE HYDROCHLORIDE 5 MG/1
5 TABLET ORAL 2 TIMES DAILY
Qty: 60 TABLET | Refills: 11 | Status: SHIPPED | OUTPATIENT
Start: 2018-12-10 | End: 2019-12-10

## 2018-12-17 ENCOUNTER — CLINICAL SUPPORT (OUTPATIENT)
Dept: REHABILITATION | Facility: HOSPITAL | Age: 28
End: 2018-12-17
Payer: OTHER GOVERNMENT

## 2018-12-17 DIAGNOSIS — M54.41 MIDLINE LOW BACK PAIN WITH RIGHT-SIDED SCIATICA, UNSPECIFIED CHRONICITY: ICD-10-CM

## 2018-12-17 DIAGNOSIS — M53.3 SACROILIAC PAIN: Primary | ICD-10-CM

## 2018-12-17 PROCEDURE — 97161 PT EVAL LOW COMPLEX 20 MIN: CPT

## 2018-12-17 PROCEDURE — 97110 THERAPEUTIC EXERCISES: CPT
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
## 2020-01-29 ENCOUNTER — OFFICE VISIT (OUTPATIENT)
Dept: INTERNAL MEDICINE | Facility: CLINIC | Age: 30
End: 2020-01-29
Payer: COMMERCIAL

## 2020-01-29 VITALS
HEART RATE: 85 BPM | OXYGEN SATURATION: 98 % | SYSTOLIC BLOOD PRESSURE: 112 MMHG | DIASTOLIC BLOOD PRESSURE: 70 MMHG | WEIGHT: 175.38 LBS | BODY MASS INDEX: 26.67 KG/M2 | TEMPERATURE: 98 F

## 2020-01-29 DIAGNOSIS — K59.1 FUNCTIONAL DIARRHEA: Primary | ICD-10-CM

## 2020-01-29 DIAGNOSIS — N39.41 URGENCY INCONTINENCE: ICD-10-CM

## 2020-01-29 PROCEDURE — 99999 PR PBB SHADOW E&M-EST. PATIENT-LVL IV: CPT | Mod: PBBFAC,,, | Performed by: FAMILY MEDICINE

## 2020-01-29 PROCEDURE — 99999 PR PBB SHADOW E&M-EST. PATIENT-LVL IV: ICD-10-PCS | Mod: PBBFAC,,, | Performed by: FAMILY MEDICINE

## 2020-01-29 PROCEDURE — 99214 PR OFFICE/OUTPT VISIT, EST, LEVL IV, 30-39 MIN: ICD-10-PCS | Mod: S$GLB,,, | Performed by: FAMILY MEDICINE

## 2020-01-29 PROCEDURE — 3008F BODY MASS INDEX DOCD: CPT | Mod: CPTII,S$GLB,, | Performed by: FAMILY MEDICINE

## 2020-01-29 PROCEDURE — 3008F PR BODY MASS INDEX (BMI) DOCUMENTED: ICD-10-PCS | Mod: CPTII,S$GLB,, | Performed by: FAMILY MEDICINE

## 2020-01-29 PROCEDURE — 99214 OFFICE O/P EST MOD 30 MIN: CPT | Mod: S$GLB,,, | Performed by: FAMILY MEDICINE

## 2020-01-29 RX ORDER — BUSPIRONE HYDROCHLORIDE 7.5 MG/1
TABLET ORAL
COMMUNITY
Start: 2020-01-09 | End: 2022-02-03

## 2020-01-29 RX ORDER — VALACYCLOVIR HYDROCHLORIDE 500 MG/1
TABLET, FILM COATED ORAL
COMMUNITY
Start: 2019-12-12 | End: 2022-02-03

## 2020-01-29 NOTE — PROGRESS NOTES
Subjective:       Patient ID: Idalia Hooper is a 29 y.o. female.    Chief Complaint: Diarrhea (uncontrollable bowels for about 2-3 months)    Lifelong stomach issues, over last 2 years it had improved. She reports symptom have returned after birth of her baby in September. She reports having bowel/bladder incontinence as she has urgency and can't get to the bathroom quickly enough. She reports issues with diarrhea. She is not nursing.    Review of Systems   Constitutional: Negative for chills, fatigue, fever and unexpected weight change.   Eyes: Negative for visual disturbance.   Respiratory: Negative for shortness of breath.    Cardiovascular: Negative for chest pain.   Gastrointestinal: Positive for diarrhea. Negative for constipation.   Musculoskeletal: Negative for myalgias.   Neurological: Negative for numbness and headaches.       Objective:      Physical Exam   Constitutional: She is oriented to person, place, and time. She appears well-developed and well-nourished. No distress.   HENT:   Head: Normocephalic and atraumatic.   Eyes: Pupils are equal, round, and reactive to light. Conjunctivae and EOM are normal. No scleral icterus.   Cardiovascular: Normal rate and regular rhythm. Exam reveals no gallop and no friction rub.   No murmur heard.  Pulmonary/Chest: Effort normal and breath sounds normal.   Abdominal: Soft. Bowel sounds are normal. She exhibits no distension and no mass. There is no hepatosplenomegaly. There is no tenderness.   Neurological: She is alert and oriented to person, place, and time. No cranial nerve deficit. Gait normal.   Psychiatric: She has a normal mood and affect.   Vitals reviewed.      Assessment:       1. Functional diarrhea    2. Urgency incontinence        Plan:     Problem List Items Addressed This Visit     None      Visit Diagnoses     Functional diarrhea    -  Primary    Relevant Orders    Ambulatory referral to Gastroenterology    Urgency incontinence        Relevant  Orders    Ambulatory Referral to Physical/Occupational Therapy

## 2020-01-30 ENCOUNTER — OFFICE VISIT (OUTPATIENT)
Dept: GASTROENTEROLOGY | Facility: CLINIC | Age: 30
End: 2020-01-30
Payer: OTHER GOVERNMENT

## 2020-01-30 VITALS
HEART RATE: 68 BPM | DIASTOLIC BLOOD PRESSURE: 68 MMHG | HEIGHT: 68 IN | WEIGHT: 177 LBS | BODY MASS INDEX: 26.83 KG/M2 | SYSTOLIC BLOOD PRESSURE: 108 MMHG

## 2020-01-30 DIAGNOSIS — K58.0 IRRITABLE BOWEL SYNDROME WITH DIARRHEA: Primary | ICD-10-CM

## 2020-01-30 PROCEDURE — 99999 PR PBB SHADOW E&M-EST. PATIENT-LVL III: CPT | Mod: PBBFAC,,, | Performed by: INTERNAL MEDICINE

## 2020-01-30 PROCEDURE — 99214 PR OFFICE/OUTPT VISIT, EST, LEVL IV, 30-39 MIN: ICD-10-PCS | Mod: S$PBB,,, | Performed by: INTERNAL MEDICINE

## 2020-01-30 PROCEDURE — 99214 OFFICE O/P EST MOD 30 MIN: CPT | Mod: S$PBB,,, | Performed by: INTERNAL MEDICINE

## 2020-01-30 PROCEDURE — 99999 PR PBB SHADOW E&M-EST. PATIENT-LVL III: ICD-10-PCS | Mod: PBBFAC,,, | Performed by: INTERNAL MEDICINE

## 2020-01-30 PROCEDURE — 99213 OFFICE O/P EST LOW 20 MIN: CPT | Mod: PBBFAC | Performed by: INTERNAL MEDICINE
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
## 2020-01-30 NOTE — LETTER
January 30, 2020      Felicia Khan MD  [address] [place] [address] [address] [place] [id number]           [facility]  [address] [place] [address] [place] [id number]  Phone: [phone]  Fax: [phone]          Patient: Idalia Hooper   MR Number: [id number]   YOB: 1990   Date of Visit: 1/30/2020       Dear Dr. Felicia Khan:    Thank you for referring Idalia Hooepr to me for evaluation. Attached you will find relevant portions of my assessment and plan of care.    If you have questions, please do not hesitate to call me. I look forward to following Idalia Hooper along with you.    Sincerely,    Alona Knapp MD    Enclosure  CC:  No Recipients    If you would like to receive this communication electronically, please contact [email or web address] or [phone] to request more information on [facility] access.    For providers and/or their staff who would like to refer a patient to [facility], please contact us through our one-stop-shop provider referral line, [facility], at [phone].    If you feel you have received this communication in error or would no longer like to receive these types of communications, please e-mail [email or web address]

## 2020-02-03 ENCOUNTER — LAB VISIT (OUTPATIENT)
Dept: LAB | Facility: HOSPITAL | Age: 30
End: 2020-02-03
Attending: INTERNAL MEDICINE
Payer: COMMERCIAL

## 2020-02-03 ENCOUNTER — PATIENT OUTREACH (OUTPATIENT)
Dept: ADMINISTRATIVE | Facility: HOSPITAL | Age: 30
End: 2020-02-03

## 2020-02-03 DIAGNOSIS — K58.0 IRRITABLE BOWEL SYNDROME WITH DIARRHEA: ICD-10-CM

## 2020-02-03 PROCEDURE — 87329 GIARDIA AG IA: CPT

## 2020-02-03 PROCEDURE — 87209 SMEAR COMPLEX STAIN: CPT

## 2020-02-03 PROCEDURE — 87427 SHIGA-LIKE TOXIN AG IA: CPT | Mod: 59

## 2020-02-03 PROCEDURE — 87449 NOS EACH ORGANISM AG IA: CPT

## 2020-02-03 PROCEDURE — 87045 FECES CULTURE AEROBIC BACT: CPT

## 2020-02-03 PROCEDURE — 87046 STOOL CULTR AEROBIC BACT EA: CPT

## 2020-02-03 PROCEDURE — 87324 CLOSTRIDIUM AG IA: CPT

## 2020-02-04 LAB
C DIFF GDH STL QL: NEGATIVE
C DIFF TOX A+B STL QL IA: NEGATIVE
CRYPTOSP AG STL QL IA: NEGATIVE
G LAMBLIA AG STL QL IA: NEGATIVE
O+P STL TRI STN: NORMAL

## 2020-02-05 LAB
E COLI SXT1 STL QL IA: NEGATIVE
E COLI SXT2 STL QL IA: NEGATIVE

## 2020-02-07 LAB — BACTERIA STL CULT: NORMAL

## 2020-04-30 ENCOUNTER — PATIENT MESSAGE (OUTPATIENT)
Dept: GASTROENTEROLOGY | Facility: CLINIC | Age: 30
End: 2020-04-30

## 2020-04-30 ENCOUNTER — OFFICE VISIT (OUTPATIENT)
Dept: GASTROENTEROLOGY | Facility: CLINIC | Age: 30
End: 2020-04-30
Payer: COMMERCIAL

## 2020-04-30 DIAGNOSIS — K52.9 CHRONIC DIARRHEA: Primary | ICD-10-CM

## 2020-04-30 PROCEDURE — 99214 OFFICE O/P EST MOD 30 MIN: CPT | Mod: 95,,, | Performed by: INTERNAL MEDICINE

## 2020-04-30 PROCEDURE — 99214 PR OFFICE/OUTPT VISIT, EST, LEVL IV, 30-39 MIN: ICD-10-PCS | Mod: 95,,, | Performed by: INTERNAL MEDICINE

## 2020-04-30 RX ORDER — CHOLESTYRAMINE 4 G/9G
4 POWDER, FOR SUSPENSION ORAL 2 TIMES DAILY
Qty: 180 PACKET | Refills: 3 | Status: SHIPPED | OUTPATIENT
Start: 2020-04-30 | End: 2022-02-03

## 2020-04-30 RX ORDER — SODIUM, POTASSIUM,MAG SULFATES 17.5-3.13G
1 SOLUTION, RECONSTITUTED, ORAL ORAL DAILY
Qty: 1 KIT | Refills: 0 | Status: SHIPPED | OUTPATIENT
Start: 2020-04-30 | End: 2020-05-02
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
## 2020-05-22 ENCOUNTER — TELEPHONE (OUTPATIENT)
Dept: ENDOSCOPY | Facility: HOSPITAL | Age: 30
End: 2020-05-22

## 2020-05-22 NOTE — TELEPHONE ENCOUNTER
----- Message from Carol Novak RN sent at 4/30/2020  9:19 AM CDT -----  Please contact patient to schedule colonoscopy in the next 1-2 months with Dr. Ra Martinez,  KERRY Medina

## 2020-12-30 ENCOUNTER — TELEPHONE (OUTPATIENT)
Dept: OBSTETRICS AND GYNECOLOGY | Facility: CLINIC | Age: 30
End: 2020-12-30

## 2020-12-30 NOTE — TELEPHONE ENCOUNTER
----- Message from Alia Yates sent at 12/30/2020  2:50 PM CST -----  Type:  Needs Medical Advice    Who Called:RAJNI TODD JUNE [[id number]]  Symptoms (please be specific):   How long has patient had these symptoms:    Pharmacy name and phone #:   Would the patient rather a call back or a response via [facility]?   Best Call Back Number: [phone]  Additional Information: Pt is 12wks pregnant and is wanting to schedule with Dr Ching

## 2021-03-25 ENCOUNTER — PATIENT MESSAGE (OUTPATIENT)
Dept: ADMINISTRATIVE | Facility: HOSPITAL | Age: 31
End: 2021-03-25

## 2022-02-03 ENCOUNTER — OFFICE VISIT (OUTPATIENT)
Dept: GASTROENTEROLOGY | Facility: CLINIC | Age: 32
End: 2022-02-03
Payer: OTHER GOVERNMENT

## 2022-02-03 ENCOUNTER — PATIENT MESSAGE (OUTPATIENT)
Dept: ENDOSCOPY | Facility: HOSPITAL | Age: 32
End: 2022-02-03
Payer: OTHER GOVERNMENT

## 2022-02-03 ENCOUNTER — PATIENT MESSAGE (OUTPATIENT)
Dept: INTERNAL MEDICINE | Facility: CLINIC | Age: 32
End: 2022-02-03

## 2022-02-03 ENCOUNTER — OFFICE VISIT (OUTPATIENT)
Dept: INTERNAL MEDICINE | Facility: CLINIC | Age: 32
End: 2022-02-03
Payer: OTHER GOVERNMENT

## 2022-02-03 VITALS
WEIGHT: 184.06 LBS | HEIGHT: 68 IN | SYSTOLIC BLOOD PRESSURE: 100 MMHG | DIASTOLIC BLOOD PRESSURE: 68 MMHG | HEART RATE: 89 BPM | BODY MASS INDEX: 27.9 KG/M2

## 2022-02-03 VITALS
HEIGHT: 68 IN | OXYGEN SATURATION: 97 % | DIASTOLIC BLOOD PRESSURE: 68 MMHG | HEART RATE: 89 BPM | TEMPERATURE: 97 F | SYSTOLIC BLOOD PRESSURE: 100 MMHG | BODY MASS INDEX: 27.9 KG/M2 | WEIGHT: 184.06 LBS

## 2022-02-03 DIAGNOSIS — R10.12 LEFT UPPER QUADRANT ABDOMINAL PAIN: ICD-10-CM

## 2022-02-03 DIAGNOSIS — R93.3 ABNORMAL FINDING ON GI TRACT IMAGING: Primary | ICD-10-CM

## 2022-02-03 DIAGNOSIS — F98.8 ATTENTION DEFICIT DISORDER (ADD) WITHOUT HYPERACTIVITY: Primary | ICD-10-CM

## 2022-02-03 DIAGNOSIS — R19.7 DIARRHEA, UNSPECIFIED TYPE: ICD-10-CM

## 2022-02-03 PROCEDURE — 99999 PR PBB SHADOW E&M-EST. PATIENT-LVL IV: CPT | Mod: PBBFAC,,, | Performed by: FAMILY MEDICINE

## 2022-02-03 PROCEDURE — 99214 PR OFFICE/OUTPT VISIT, EST, LEVL IV, 30-39 MIN: ICD-10-PCS | Mod: S$PBB,,, | Performed by: INTERNAL MEDICINE

## 2022-02-03 PROCEDURE — 99214 PR OFFICE/OUTPT VISIT, EST, LEVL IV, 30-39 MIN: ICD-10-PCS | Mod: S$PBB,,, | Performed by: FAMILY MEDICINE

## 2022-02-03 PROCEDURE — 99214 OFFICE O/P EST MOD 30 MIN: CPT | Mod: S$PBB,,, | Performed by: INTERNAL MEDICINE

## 2022-02-03 PROCEDURE — 99214 OFFICE O/P EST MOD 30 MIN: CPT | Mod: S$PBB,,, | Performed by: FAMILY MEDICINE

## 2022-02-03 PROCEDURE — 99999 PR PBB SHADOW E&M-EST. PATIENT-LVL IV: CPT | Mod: PBBFAC,,, | Performed by: INTERNAL MEDICINE

## 2022-02-03 PROCEDURE — 99214 OFFICE O/P EST MOD 30 MIN: CPT | Mod: PBBFAC,27 | Performed by: FAMILY MEDICINE

## 2022-02-03 PROCEDURE — 99214 OFFICE O/P EST MOD 30 MIN: CPT | Mod: PBBFAC | Performed by: INTERNAL MEDICINE

## 2022-02-03 PROCEDURE — 99999 PR PBB SHADOW E&M-EST. PATIENT-LVL IV: ICD-10-PCS | Mod: PBBFAC,,, | Performed by: INTERNAL MEDICINE

## 2022-02-03 PROCEDURE — 99999 PR PBB SHADOW E&M-EST. PATIENT-LVL IV: ICD-10-PCS | Mod: PBBFAC,,, | Performed by: FAMILY MEDICINE

## 2022-02-03 RX ORDER — SOD SULF/POT CHLORIDE/MAG SULF 1.479 G
12 TABLET ORAL DAILY
Qty: 24 TABLET | Refills: 0 | Status: SHIPPED | OUTPATIENT
Start: 2022-02-03 | End: 2022-02-04 | Stop reason: SDUPTHER

## 2022-02-03 RX ORDER — LISDEXAMFETAMINE DIMESYLATE CAPSULES 20 MG/1
20 CAPSULE ORAL EVERY MORNING
Qty: 30 CAPSULE | Refills: 0 | Status: CANCELLED | OUTPATIENT
Start: 2022-02-03

## 2022-02-03 RX ORDER — DICYCLOMINE HYDROCHLORIDE 20 MG/1
20 TABLET ORAL 3 TIMES DAILY PRN
COMMUNITY
Start: 2022-02-02 | End: 2022-08-10

## 2022-02-03 RX ORDER — PANTOPRAZOLE SODIUM 40 MG/1
1 TABLET, DELAYED RELEASE ORAL EVERY MORNING
COMMUNITY
Start: 2022-02-02 | End: 2022-08-10

## 2022-02-03 RX ORDER — ONDANSETRON 4 MG/1
4 TABLET, FILM COATED ORAL EVERY 6 HOURS PRN
COMMUNITY
Start: 2022-02-02 | End: 2022-08-10

## 2022-02-03 RX ORDER — SERTRALINE HYDROCHLORIDE 50 MG/1
50 TABLET, FILM COATED ORAL
COMMUNITY
End: 2022-02-03

## 2022-02-03 RX ORDER — LISDEXAMFETAMINE DIMESYLATE CAPSULES 20 MG/1
20 CAPSULE ORAL EVERY MORNING
COMMUNITY
End: 2022-02-03 | Stop reason: SDUPTHER

## 2022-02-03 NOTE — Clinical Note
Seen yesterday at [facility] ER, CT shows possible IBD and splenomegaly;  she was able to schedule for 3/3/22 to see Dr. Knapp. Can y'all assist with her with a sooner appointment? Thank you!
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
## 2022-02-03 NOTE — PROGRESS NOTES
Subjective:       Patient ID: Idalia Hooper is a 31 y.o. female.    Chief Complaint: ADHD    Patient presents to clinic today to discuss resuming treatment for ADD. Diagnosed in 3rd grade. Started on zoloft per OB/GYN, it has helped a lot. Also seen in ER at [facility] for abdominal pain yesterday. CT showed possible IBD and splenomegaly. Labs in ER unremarkable. Was seen by Dr. Knapp  In April 2020, colonoscopy recommended at that time, but not done due to cost per patient. Patient is otherwise without concerns today.        Review of Systems   Constitutional: Positive for fatigue (chronic). Negative for chills, fever and unexpected weight change.   HENT: Negative for congestion, dental problem, ear pain, hearing loss, rhinorrhea and trouble swallowing.    Eyes: Negative for pain and visual disturbance.   Respiratory: Negative for cough and shortness of breath.    Cardiovascular: Negative for chest pain, palpitations and leg swelling.   Gastrointestinal: Positive for abdominal distention, abdominal pain and nausea. Negative for blood in stool, constipation, diarrhea and vomiting.   Genitourinary: Negative for difficulty urinating and vaginal discharge.   Musculoskeletal: Negative for arthralgias and myalgias.   Skin: Negative for rash.   Neurological: Positive for headaches (chronic). Negative for dizziness, weakness and numbness.   Hematological: Negative for adenopathy. Does not bruise/bleed easily.   Psychiatric/Behavioral: Negative for dysphoric mood and sleep disturbance. The patient is not nervous/anxious.          Objective:      Physical Exam  Vitals reviewed.   Constitutional:       General: She is not in acute distress.     Appearance: She is well-developed.   HENT:      Head: Normocephalic and atraumatic.   Eyes:      General: Lids are normal. No scleral icterus.     Extraocular Movements: Extraocular movements intact.      Conjunctiva/sclera: Conjunctivae normal.      Pupils: Pupils are equal, round,  and reactive to light.   Pulmonary:      Effort: Pulmonary effort is normal.   Neurological:      Mental Status: She is alert and oriented to person, place, and time.      Cranial Nerves: No cranial nerve deficit.      Gait: Gait normal.   Psychiatric:         Mood and Affect: Mood and affect normal.         Assessment:       1. Attention deficit disorder (ADD) without hyperactivity    2. Left upper quadrant abdominal pain        Plan:     Problem List Items Addressed This Visit     ADD (attention deficit disorder) - Primary    Current Assessment & Plan     Resume treatment; will try on vyvanse 20 mg daily           Other Visit Diagnoses     Left upper quadrant abdominal pain        Relevant Orders    Ambulatory referral/consult to Gastroenterology

## 2022-02-04 RX ORDER — LISDEXAMFETAMINE DIMESYLATE CAPSULES 20 MG/1
20 CAPSULE ORAL EVERY MORNING
Qty: 30 CAPSULE | Refills: 0 | Status: SHIPPED | OUTPATIENT
Start: 2022-02-04 | End: 2022-02-25 | Stop reason: SDUPTHER

## 2022-02-04 RX ORDER — SOD SULF/POT CHLORIDE/MAG SULF 1.479 G
12 TABLET ORAL DAILY
Qty: 24 TABLET | Refills: 0 | Status: SHIPPED | OUTPATIENT
Start: 2022-02-04 | End: 2022-02-06

## 2022-02-21 ENCOUNTER — PATIENT MESSAGE (OUTPATIENT)
Dept: ENDOSCOPY | Facility: HOSPITAL | Age: 32
End: 2022-02-21
Payer: OTHER GOVERNMENT

## 2022-02-22 RX ORDER — SODIUM, POTASSIUM,MAG SULFATES 17.5-3.13G
1 SOLUTION, RECONSTITUTED, ORAL ORAL DAILY
Qty: 1 KIT | Refills: 0 | Status: SHIPPED | OUTPATIENT
Start: 2022-02-22 | End: 2022-02-23

## 2022-02-25 DIAGNOSIS — F98.8 ATTENTION DEFICIT DISORDER (ADD) WITHOUT HYPERACTIVITY: ICD-10-CM

## 2022-03-03 RX ORDER — LISDEXAMFETAMINE DIMESYLATE CAPSULES 20 MG/1
20 CAPSULE ORAL EVERY MORNING
Qty: 30 CAPSULE | Refills: 0 | Status: SHIPPED | OUTPATIENT
Start: 2022-03-03 | End: 2022-08-10

## 2022-03-05 ENCOUNTER — PATIENT MESSAGE (OUTPATIENT)
Dept: ENDOSCOPY | Facility: HOSPITAL | Age: 32
End: 2022-03-05
Payer: OTHER GOVERNMENT

## 2022-03-07 ENCOUNTER — PATIENT MESSAGE (OUTPATIENT)
Dept: ENDOSCOPY | Facility: HOSPITAL | Age: 32
End: 2022-03-07
Payer: OTHER GOVERNMENT

## 2022-03-07 RX ORDER — SODIUM, POTASSIUM,MAG SULFATES 17.5-3.13G
1 SOLUTION, RECONSTITUTED, ORAL ORAL DAILY
Qty: 1 KIT | Refills: 0 | Status: ON HOLD | OUTPATIENT
Start: 2022-03-07 | End: 2022-03-08 | Stop reason: HOSPADM

## 2022-03-08 ENCOUNTER — ANESTHESIA EVENT (OUTPATIENT)
Dept: ENDOSCOPY | Facility: HOSPITAL | Age: 32
End: 2022-03-08
Payer: OTHER GOVERNMENT

## 2022-03-08 ENCOUNTER — ANESTHESIA (OUTPATIENT)
Dept: ENDOSCOPY | Facility: HOSPITAL | Age: 32
End: 2022-03-08
Payer: OTHER GOVERNMENT

## 2022-03-08 ENCOUNTER — HOSPITAL ENCOUNTER (OUTPATIENT)
Facility: HOSPITAL | Age: 32
Discharge: HOME | End: 2022-03-08
Attending: INTERNAL MEDICINE | Admitting: INTERNAL MEDICINE
Payer: OTHER GOVERNMENT

## 2022-03-08 DIAGNOSIS — R19.7 DIARRHEA, UNSPECIFIED TYPE: ICD-10-CM

## 2022-03-08 DIAGNOSIS — R10.9 ABDOMINAL PAIN: ICD-10-CM

## 2022-03-08 DIAGNOSIS — R93.3 ABNORMAL FINDING ON GI TRACT IMAGING: ICD-10-CM

## 2022-03-08 DIAGNOSIS — R10.9 ABDOMINAL PAIN, UNSPECIFIED ABDOMINAL LOCATION: Primary | ICD-10-CM

## 2022-03-08 LAB
B-HCG UR QL: NEGATIVE
CTP QC/QA: YES
CTP QC/QA: YES
SARS-COV-2 RDRP RESP QL NAA+PROBE: NEGATIVE

## 2022-03-08 PROCEDURE — 81025 URINE PREGNANCY TEST: CPT | Performed by: INTERNAL MEDICINE

## 2022-03-08 PROCEDURE — 00811 ANES LWR INTST NDSC NOS: CPT | Performed by: INTERNAL MEDICINE

## 2022-03-08 PROCEDURE — 63600175 PHARM REV CODE 636 W HCPCS: Performed by: NURSE ANESTHETIST, CERTIFIED REGISTERED

## 2022-03-08 PROCEDURE — 88305 TISSUE EXAM BY PATHOLOGIST: CPT | Mod: 26,,, | Performed by: PATHOLOGY

## 2022-03-08 PROCEDURE — 27201012 HC FORCEPS, HOT/COLD, DISP: Performed by: INTERNAL MEDICINE

## 2022-03-08 PROCEDURE — 45380 COLONOSCOPY AND BIOPSY: CPT | Performed by: INTERNAL MEDICINE

## 2022-03-08 PROCEDURE — 88305 TISSUE EXAM BY PATHOLOGIST: CPT | Mod: 59 | Performed by: PATHOLOGY

## 2022-03-08 PROCEDURE — 45380 COLONOSCOPY AND BIOPSY: CPT | Mod: ,,, | Performed by: INTERNAL MEDICINE

## 2022-03-08 PROCEDURE — U0002 COVID-19 LAB TEST NON-CDC: HCPCS | Performed by: INTERNAL MEDICINE

## 2022-03-08 PROCEDURE — 37000009 HC ANESTHESIA EA ADD 15 MINS: Performed by: INTERNAL MEDICINE

## 2022-03-08 PROCEDURE — 88305 TISSUE EXAM BY PATHOLOGIST: ICD-10-PCS | Mod: 26,,, | Performed by: PATHOLOGY

## 2022-03-08 PROCEDURE — 37000008 HC ANESTHESIA 1ST 15 MINUTES: Performed by: INTERNAL MEDICINE

## 2022-03-08 PROCEDURE — 45380 PR COLONOSCOPY,BIOPSY: ICD-10-PCS | Mod: ,,, | Performed by: INTERNAL MEDICINE

## 2022-03-08 RX ORDER — PROPOFOL 10 MG/ML
VIAL (ML) INTRAVENOUS
Status: DISCONTINUED | OUTPATIENT
Start: 2022-03-08 | End: 2022-03-08

## 2022-03-08 RX ADMIN — PROPOFOL 100 MG: 10 INJECTION, EMULSION INTRAVENOUS at 07:03

## 2022-03-08 NOTE — PROVATION PATIENT INSTRUCTIONS
Discharge Summary/Instructions after an Endoscopic Procedure  Patient Name: Idalia Hooper  Patient MRN: [id number]  Patient YOB: 1990 Tuesday, March 8, 2022 Alona Knapp MD  Dear patient,  As a result of recent federal legislation (The Federal Cures Act), you may   receive lab or pathology results from your procedure in your [facility]   account before your physician is able to contact you. Your physician or   their representative will relay the results to you with their   recommendations at their soonest availability.  Thank you,  RESTRICTIONS:  During your procedure today, you received medications for sedation.  These   medications may affect your judgment, balance and coordination.  Therefore,   for 24 hours, you have the following restrictions:   - DO NOT drive a car, operate machinery, make legal/financial decisions,   sign important papers or drink alcohol.    ACTIVITY:  Today: no heavy lifting, straining or running due to procedural   sedation/anesthesia.  The following day: return to full activity including work.  DIET:  Eat and drink normally unless instructed otherwise.     TREATMENT FOR COMMON SIDE EFFECTS:  - Mild abdominal pain, nausea, belching, bloating or excessive gas:  rest,   eat lightly and use a heating pad.  - Sore Throat: treat with throat lozenges and/or gargle with warm salt   water.  - Because air was used during the procedure, expelling large amounts of air   from your rectum or belching is normal.  - If a bowel prep was taken, you may not have a bowel movement for 1-3 days.    This is normal.  SYMPTOMS TO WATCH FOR AND REPORT TO YOUR PHYSICIAN:  1. Abdominal pain or bloating, other than gas cramps.  2. Chest pain.  3. Back pain.  4. Signs of infection such as: chills or fever occurring within 24 hours   after the procedure.  5. Rectal bleeding, which would show as bright red, maroon, or black stools.   (A tablespoon of blood from the rectum is not serious, especially  if   hemorrhoids are present.)  6. Vomiting.  7. Weakness or dizziness.  GO DIRECTLY TO THE NEAREST EMERGENCY ROOM IF YOU HAVE ANY OF THE FOLLOWING:      Difficulty breathing              Chills and/or fever over 101 F   Persistent vomiting and/or vomiting blood   Severe abdominal pain   Severe chest pain   Black, tarry stools   Bleeding- more than one tablespoon   Any other symptom or condition that you feel may need urgent attention  Your doctor recommends these additional instructions:  If any biopsies were taken, your doctors clinic will contact you in 1 to 2   weeks with any results.  - Discharge patient to home (via wheelchair).   - Resume previous diet.   - Continue present medications.   - Await pathology results.   - Repeat colonoscopy at age 47 for screening purposes.   - Telephone GI clinic for pathology results in 2 weeks.   - Patient has a contact number available for emergencies.  The signs and   symptoms of potential delayed complications were discussed with the   patient.  Return to normal activities tomorrow.  Written discharge   instructions were provided to the patient.  For questions, problems or results please call your physician Alona Knapp MD at Work:  [phone]  If you have any questions about the above instructions, call the GI   department at [phone] or call the endoscopy unit at [phone]   from 7am until 3 pm.  [facility], EMERGENCY ROOM PHONE NUMBER:   [phone]  IF A COMPLICATION OR EMERGENCY SITUATION ARISES AND YOU ARE UNABLE TO REACH   YOUR PHYSICIAN - GO DIRECTLY TO THE EMERGENCY ROOM.  I have read or have had read to me these discharge instructions for my   procedure and have received a written copy.  I understand these   instructions and will follow-up with my physician if I have any questions.     __________________________________       _____________________________________  Nurse Signature                                           Patient/Designated   Responsible Party Signature  MD Alona Aparicio MD  3/8/2022 7:29:23 AM  [employer]

## 2022-03-08 NOTE — DISCHARGE SUMMARY
[facility] - Endoscopy (Hospital)  Discharge Note  Short Stay    Procedure(s) (LRB):  COLONOSCOPY (N/A)    OUTCOME: Patient tolerated treatment/procedure well without complication and is now ready for discharge.    DISPOSITION: Home or Self Care    FINAL DIAGNOSIS:  Abdominal pain    FOLLOWUP: With primary care provider    DISCHARGE INSTRUCTIONS:  No discharge procedures on file.

## 2022-03-08 NOTE — ANESTHESIA POSTPROCEDURE EVALUATION
Anesthesia Post Evaluation    Patient: Idalia Hooper    Procedure(s) Performed: Procedure(s) (LRB):  COLONOSCOPY (N/A)    Final Anesthesia Type: MAC      Patient location during evaluation: PACU  Patient participation: Yes- Able to Participate  Level of consciousness: awake and alert  Post-procedure vital signs: reviewed and stable  Pain management: adequate  Airway patency: patent  BECKI mitigation strategies: Multimodal analgesia  PONV status at discharge: No PONV  Anesthetic complications: no      Cardiovascular status: blood pressure returned to baseline  Respiratory status: unassisted  Hydration status: euvolemic  Follow-up not needed.                                              No case tracking events are documented in the log.      Pain/Michelle Score: No data recorded
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
## 2022-03-08 NOTE — TRANSFER OF CARE
"Anesthesia Transfer of Care Note    Patient: Idalia Hooper    Procedure(s) Performed: Procedure(s) (LRB):  COLONOSCOPY (N/A)    Patient location: PACU    Anesthesia Type: MAC    Transport from OR: Transported from OR on room air with adequate spontaneous ventilation    Post pain: adequate analgesia    Post assessment: no apparent anesthetic complications and tolerated procedure well    Post vital signs: stable    Level of consciousness: awake and alert    Nausea/Vomiting: no nausea/vomiting    Complications: none    Transfer of care protocol was followed      Last vitals:   Visit Vitals  BP (!) 107/55 (BP Location: Left arm, Patient Position: Lying)   Pulse 63   Temp 36.7 °C (98.1 °F) (Temporal)   Resp 14   Ht 5' 8" (1.727 m)   Wt 81.6 kg (180 lb)   LMP 02/18/2022 (Exact Date)   SpO2 99%   Breastfeeding No   BMI 27.37 kg/m²     "

## 2022-03-08 NOTE — ANESTHESIA PREPROCEDURE EVALUATION
03/08/2022  Idalia Hooper is a 31 y.o., female.      Pre-op Assessment    I have reviewed the Patient Summary Reports.          Review of Systems  Anesthesia Hx:  No problems with previous Anesthesia    Social:  Former Smoker    Hematology/Oncology:  Hematology Normal   Oncology Normal     EENT/Dental:EENT/Dental Normal   Cardiovascular:  Cardiovascular Normal     Pulmonary:   Asthma    Hepatic/GI:   GERD    Neurological:  Neurology Normal    Endocrine:  Endocrine Normal    Psych:   Psychiatric History          Physical Exam  General: Well nourished    Airway:  Mallampati: II / II  Mouth Opening: Normal  TM Distance: Normal  Tongue: Normal  Neck ROM: Normal ROM    Dental:  Intact        Anesthesia Plan  Type of Anesthesia, risks & benefits discussed:    Anesthesia Type: MAC  Intra-op Monitoring Plan: Standard ASA Monitors  Post Op Pain Control Plan: IV/PO Opioids PRN  Induction:  IV  Airway Plan: Direct  Informed Consent: Patient consented to blood products? Yes  ASA Score: 2  Day of Surgery Review of History & Physical: H&P Update referred to the surgeon/provider.I have interviewed and examined the patient. I have reviewed the patient's H&P dated: There are no significant changes.     Ready For Surgery From Anesthesia Perspective.     .

## 2022-03-10 VITALS
SYSTOLIC BLOOD PRESSURE: 108 MMHG | BODY MASS INDEX: 27.28 KG/M2 | WEIGHT: 180 LBS | HEART RATE: 67 BPM | OXYGEN SATURATION: 98 % | HEIGHT: 68 IN | DIASTOLIC BLOOD PRESSURE: 58 MMHG | TEMPERATURE: 98 F | RESPIRATION RATE: 18 BRPM

## 2022-03-14 LAB
FINAL PATHOLOGIC DIAGNOSIS: NORMAL
GROSS: NORMAL
Lab: NORMAL

## 2022-05-24 ENCOUNTER — PATIENT MESSAGE (OUTPATIENT)
Dept: INTERNAL MEDICINE | Facility: CLINIC | Age: 32
End: 2022-05-24
Payer: OTHER GOVERNMENT

## 2022-05-24 NOTE — TELEPHONE ENCOUNTER
It appears she has a local reaction to the wasp sting, if not resolved I recommend she be seen. If she has respiratory involvement she should seek medical attention. Epi pens are indicated for patients with history of anaphylaxis. If she would like to see allergy for an opinion we can refer her.

## 2022-08-10 ENCOUNTER — OFFICE VISIT (OUTPATIENT)
Dept: INTERNAL MEDICINE | Facility: CLINIC | Age: 32
End: 2022-08-10
Payer: OTHER GOVERNMENT

## 2022-08-10 VITALS
BODY MASS INDEX: 25.9 KG/M2 | SYSTOLIC BLOOD PRESSURE: 120 MMHG | WEIGHT: 170.88 LBS | DIASTOLIC BLOOD PRESSURE: 60 MMHG | HEART RATE: 100 BPM | OXYGEN SATURATION: 97 % | TEMPERATURE: 98 F | HEIGHT: 68 IN

## 2022-08-10 DIAGNOSIS — Z00.00 ROUTINE GENERAL MEDICAL EXAMINATION AT A HEALTH CARE FACILITY: Primary | ICD-10-CM

## 2022-08-10 DIAGNOSIS — Z13.220 SCREENING FOR LIPOID DISORDERS: ICD-10-CM

## 2022-08-10 DIAGNOSIS — F98.8 ATTENTION DEFICIT DISORDER (ADD) WITHOUT HYPERACTIVITY: ICD-10-CM

## 2022-08-10 DIAGNOSIS — Z13.29 THYROID DISORDER SCREEN: ICD-10-CM

## 2022-08-10 PROCEDURE — 99999 PR PBB SHADOW E&M-EST. PATIENT-LVL IV: CPT | Mod: PBBFAC,,, | Performed by: FAMILY MEDICINE

## 2022-08-10 PROCEDURE — 99214 PR OFFICE/OUTPT VISIT, EST, LEVL IV, 30-39 MIN: ICD-10-PCS | Mod: S$PBB,,, | Performed by: FAMILY MEDICINE

## 2022-08-10 PROCEDURE — 99999 PR PBB SHADOW E&M-EST. PATIENT-LVL IV: ICD-10-PCS | Mod: PBBFAC,,, | Performed by: FAMILY MEDICINE

## 2022-08-10 PROCEDURE — 99214 OFFICE O/P EST MOD 30 MIN: CPT | Mod: PBBFAC | Performed by: FAMILY MEDICINE

## 2022-08-10 PROCEDURE — 99214 OFFICE O/P EST MOD 30 MIN: CPT | Mod: S$PBB,,, | Performed by: FAMILY MEDICINE

## 2022-08-10 RX ORDER — LISDEXAMFETAMINE DIMESYLATE 30 MG/1
30 CAPSULE ORAL EVERY MORNING
Qty: 30 CAPSULE | Refills: 0 | Status: SHIPPED | OUTPATIENT
Start: 2022-08-10 | End: 2022-09-12 | Stop reason: SDUPTHER

## 2022-08-10 NOTE — PROGRESS NOTES
Subjective:       Patient ID: Idalia Hooper is a 31 y.o. female.    Chief Complaint: Follow-up (6M)    Patient presents to clinic today for follow-up of ADD medications.  She reports the 20 mg helps but wears off by the end of the day.  She is otherwise without concerns today.    Review of Systems   Constitutional: Negative for chills, fatigue, fever and unexpected weight change.   Eyes: Negative for visual disturbance.   Respiratory: Negative for shortness of breath.    Cardiovascular: Negative for chest pain.   Musculoskeletal: Negative for myalgias.   Neurological: Negative for headaches.         Objective:      Physical Exam  Vitals reviewed.   Constitutional:       General: She is not in acute distress.     Appearance: She is well-developed.   HENT:      Head: Normocephalic and atraumatic.   Eyes:      General: Lids are normal. No scleral icterus.     Extraocular Movements: Extraocular movements intact.      Conjunctiva/sclera: Conjunctivae normal.      Pupils: Pupils are equal, round, and reactive to light.   Pulmonary:      Effort: Pulmonary effort is normal.   Neurological:      Mental Status: She is alert and oriented to person, place, and time.      Cranial Nerves: No cranial nerve deficit.      Gait: Gait normal.   Psychiatric:         Mood and Affect: Mood and affect normal.         Assessment:       1. Attention deficit disorder (ADD) without hyperactivity        Plan:   1. Attention deficit disorder (ADD) without hyperactivity  Assessment & Plan:  After discussion we have agreed to try her on an increased dose of Vyvanse 30 mg daily she will notify me with next prescription if she prefers to go back to 20 mg daily    Orders:  -     lisdexamfetamine (VYVANSE) 30 MG capsule      Health Maintenance reviewed/updated.  Discussed eligibility for covid booster, given scheduling information.

## 2022-08-10 NOTE — ASSESSMENT & PLAN NOTE
After discussion we have agreed to try her on an increased dose of Vyvanse 30 mg daily she will notify me with next prescription if she prefers to go back to 20 mg daily

## 2022-08-10 NOTE — PATIENT INSTRUCTIONS
Moderna and Pfizer vaccine booster shots are approved for adults at increased risk at least six months after their initial immunization and Ranjit & Ranjit (J&J) COVID-19 vaccine booster shots are approved for all adults at least two months after their initial immunization.    Booster doses for all three COVID-19 vaccines, Pfizer, Moderna and Ranjit & Ranjit, are now available to the public and are being administered at [facility] vaccination locations.     If you received an mRNA vaccine (Pfizer or Moderna) it is recommended that you receive the same booster dose as your original vaccine series. However, all patients eligible for a booster dose may decide to mix and match your booster dose.     Below are the current mix and match options [facility] currently offers:    Pfizer Vaccine Recipients:  Booster Dose 6 months following 2nd dose can be, in order of recommendation:  Pfizer Booster Dose,  Moderna Booster Dose, or  Ranjit & Ranjit Booster Dose    Ranjit & Ranjit Vaccine Recipients:   Booster Dose 2 months following initial dose can be, in order of recommendation:  Pfizer Booster Dose,  Moderna Booster Dose, or  Ranjit & Ranjit Booster Dose    Both Pfizer and Ranjit & Ranjit boosters have the same dosage as the original vaccine regimen.    Moderna Vaccine Recipients:   Booster Dose 6 months following 2nd dose can be, in order of recommendation:  Moderna Booster Dose,  Pfizer Booster Dose, or  Ranjit & Ranjit Booster Dose    The Moderna booster is half the dosage of the original two-dose Moderna series, and [facility] will be following this guidance as outlined by the FDA and CDC.    International patients who have received a non-[place] approved COVID-19 vaccine are eligible for either a Pfizer booster dose or a Ranjit & Ranjit booster dose 28 days following their original vaccine dose.     Please schedule your appointment via [facility] or by calling [phone]. Walk-ins will also be  accepted as supply allows.    To learn more about COVID-19 vaccination and community vaccine locations, please visit [email or web address].

## 2022-08-31 ENCOUNTER — PATIENT MESSAGE (OUTPATIENT)
Dept: INTERNAL MEDICINE | Facility: CLINIC | Age: 32
End: 2022-08-31
Payer: OTHER GOVERNMENT

## 2023-02-10 ENCOUNTER — OFFICE VISIT (OUTPATIENT)
Dept: INTERNAL MEDICINE | Facility: CLINIC | Age: 33
End: 2023-02-10
Payer: OTHER GOVERNMENT

## 2023-02-10 VITALS
SYSTOLIC BLOOD PRESSURE: 120 MMHG | BODY MASS INDEX: 22.6 KG/M2 | HEART RATE: 103 BPM | DIASTOLIC BLOOD PRESSURE: 64 MMHG | TEMPERATURE: 98 F | HEIGHT: 68 IN | RESPIRATION RATE: 18 BRPM | WEIGHT: 149.13 LBS | OXYGEN SATURATION: 100 %

## 2023-02-10 DIAGNOSIS — F98.8 ATTENTION DEFICIT DISORDER, UNSPECIFIED HYPERACTIVITY PRESENCE: ICD-10-CM

## 2023-02-10 DIAGNOSIS — Z00.00 ROUTINE GENERAL MEDICAL EXAMINATION AT A HEALTH CARE FACILITY: Primary | ICD-10-CM

## 2023-02-10 DIAGNOSIS — M79.18 MYALGIA, MULTIPLE SITES: ICD-10-CM

## 2023-02-10 DIAGNOSIS — J45.20 MILD INTERMITTENT ASTHMA WITHOUT COMPLICATION: ICD-10-CM

## 2023-02-10 DIAGNOSIS — F41.9 ANXIETY: ICD-10-CM

## 2023-02-10 DIAGNOSIS — F43.10 PTSD (POST-TRAUMATIC STRESS DISORDER): ICD-10-CM

## 2023-02-10 PROBLEM — R10.9 ABDOMINAL PAIN: Status: RESOLVED | Noted: 2022-03-08 | Resolved: 2023-02-10

## 2023-02-10 PROBLEM — M54.50 ACUTE MIDLINE LOW BACK PAIN WITHOUT SCIATICA: Status: RESOLVED | Noted: 2018-08-01 | Resolved: 2023-02-10

## 2023-02-10 PROBLEM — R19.7 DIARRHEA: Status: RESOLVED | Noted: 2022-03-08 | Resolved: 2023-02-10

## 2023-02-10 PROCEDURE — 99215 OFFICE O/P EST HI 40 MIN: CPT | Mod: PBBFAC | Performed by: PHYSICIAN ASSISTANT

## 2023-02-10 PROCEDURE — 99999 PR PBB SHADOW E&M-EST. PATIENT-LVL V: CPT | Mod: PBBFAC,,, | Performed by: PHYSICIAN ASSISTANT

## 2023-02-10 PROCEDURE — 99999 PR PBB SHADOW E&M-EST. PATIENT-LVL V: ICD-10-PCS | Mod: PBBFAC,,, | Performed by: PHYSICIAN ASSISTANT

## 2023-02-10 PROCEDURE — 99395 PR PREVENTIVE VISIT,EST,18-39: ICD-10-PCS | Mod: S$PBB,,, | Performed by: PHYSICIAN ASSISTANT

## 2023-02-10 PROCEDURE — 99395 PREV VISIT EST AGE 18-39: CPT | Mod: S$PBB,,, | Performed by: PHYSICIAN ASSISTANT

## 2023-02-10 NOTE — PATIENT INSTRUCTIONS
"The bivalent covid booster is now available. You can schedule an appointment for the vaccine through [facility] or ask  to assist you with scheduling an appointment for the vaccine.    The bivalent vaccines, known as the "Omicron" vaccines, target both the original strain of COVID-19 as well as the Omicron variant, which is now the predominant strain in the [place].    The Omicron booster is authorized for individuals 12 years and older and is given two months after last dose of primary or booster shot.   "

## 2023-02-10 NOTE — PROGRESS NOTES
Subjective:       Patient ID: Idalia Hooper is a 32 y.o. female.    Chief Complaint: Annual Exam      Patient presents to clinic today for annual physical exam.      Review of Systems   Constitutional:  Positive for unexpected weight change (Expected, due to ADHD medications causing decreased appetite and weight loss). Negative for chills, fatigue and fever.   HENT:  Positive for dental problem (Needs to see a dentist). Negative for congestion, ear pain, hearing loss, rhinorrhea and trouble swallowing.    Eyes:  Negative for pain and visual disturbance.   Respiratory:  Negative for cough and shortness of breath.    Cardiovascular:  Negative for chest pain, palpitations and leg swelling.   Gastrointestinal:  Negative for abdominal distention, abdominal pain, blood in stool, constipation, diarrhea, nausea and vomiting.   Genitourinary:  Negative for difficulty urinating and vaginal discharge.   Musculoskeletal:  Positive for arthralgias (Chronic, diffuse, has flare-ups intermittently, since having COVID July 2021.) and myalgias (Chronic, diffuse, has flare-ups intermittently, since having COVID July 2021.).   Skin:  Negative for rash.   Neurological:  Positive for dizziness (Chronic, after taking medications) and headaches (Chronic, after taking medications). Negative for weakness and numbness.   Hematological:  Negative for adenopathy. Does not bruise/bleed easily.   Psychiatric/Behavioral:  Positive for dysphoric mood (Chronic) and sleep disturbance (Chronic). The patient is nervous/anxious (Chronic).      Objective:      Physical Exam  Vitals and nursing note reviewed.   Constitutional:       General: She is not in acute distress.     Appearance: Normal appearance. She is well-developed.   HENT:      Head: Normocephalic and atraumatic.      Right Ear: Tympanic membrane, ear canal and external ear normal.      Left Ear: Tympanic membrane, ear canal and external ear normal.      Nose: Nose normal. No mucosal  edema or rhinorrhea.      Mouth/Throat:      Lips: Pink.      Mouth: Mucous membranes are moist.      Pharynx: Oropharynx is clear. Uvula midline.   Eyes:      General: Lids are normal. No scleral icterus.     Extraocular Movements: Extraocular movements intact.      Conjunctiva/sclera: Conjunctivae normal.      Pupils: Pupils are equal, round, and reactive to light.   Neck:      Thyroid: No thyromegaly.   Cardiovascular:      Rate and Rhythm: Normal rate and regular rhythm.      Pulses: Normal pulses.   Pulmonary:      Effort: Pulmonary effort is normal.      Breath sounds: Normal breath sounds. No wheezing, rhonchi or rales.   Abdominal:      General: Bowel sounds are normal. There is no distension.      Palpations: Abdomen is soft. There is no mass.      Tenderness: There is no abdominal tenderness.   Musculoskeletal:         General: Normal range of motion.      Cervical back: Normal range of motion and neck supple.      Right lower leg: No edema.      Left lower leg: No edema.   Lymphadenopathy:      Cervical: No cervical adenopathy.   Skin:     General: Skin is warm and dry.      Findings: No lesion or rash.      Nails: There is no clubbing.   Neurological:      Mental Status: She is alert.      Cranial Nerves: No cranial nerve deficit.      Sensory: No sensory deficit.   Psychiatric:         Mood and Affect: Mood and affect normal.       Assessment:       1. Routine general medical examination at a health care facility    2. Attention deficit disorder, unspecified hyperactivity presence    3. Anxiety    4. Mild intermittent asthma without complication    5. PTSD (post-traumatic stress disorder)    6. Myalgia, multiple sites          Plan:   1. Routine general medical examination at a health care facility    2. Attention deficit disorder, unspecified hyperactivity presence  Assessment & Plan:  Stable on Vyvanse 20 mg      3. Anxiety  Assessment & Plan:  Stable on Zoloft      4. Mild intermittent asthma without  complication  Assessment & Plan:  Stable on albuterol p.r.n.      5. PTSD (post-traumatic stress disorder)  Assessment & Plan:  Stable on Zoloft      6. Myalgia, multiple sites  -     Ambulatory referral/consult to Rheumatology; Future; Expected date: 02/17/2023        Fasting labs ASAP  Discussed Covid booster, scheduling information given.  Schedule six-month follow-up with Dr. Khan   Health [facility] reviewed/updated.

## 2023-03-01 ENCOUNTER — PATIENT MESSAGE (OUTPATIENT)
Dept: INTERNAL MEDICINE | Facility: CLINIC | Age: 33
End: 2023-03-01
Payer: OTHER GOVERNMENT

## 2023-03-02 ENCOUNTER — HOSPITAL ENCOUNTER (OUTPATIENT)
Dept: RADIOLOGY | Facility: HOSPITAL | Age: 33
Discharge: HOME | End: 2023-03-02
Attending: INTERNAL MEDICINE
Payer: OTHER GOVERNMENT

## 2023-03-02 ENCOUNTER — OFFICE VISIT (OUTPATIENT)
Dept: RHEUMATOLOGY | Facility: CLINIC | Age: 33
End: 2023-03-02
Payer: OTHER GOVERNMENT

## 2023-03-02 ENCOUNTER — PATIENT MESSAGE (OUTPATIENT)
Dept: RHEUMATOLOGY | Facility: CLINIC | Age: 33
End: 2023-03-02

## 2023-03-02 VITALS
DIASTOLIC BLOOD PRESSURE: 72 MMHG | BODY MASS INDEX: 22.81 KG/M2 | HEIGHT: 68 IN | HEART RATE: 66 BPM | SYSTOLIC BLOOD PRESSURE: 114 MMHG

## 2023-03-02 DIAGNOSIS — M47.819 SPONDYLOARTHROPATHY: ICD-10-CM

## 2023-03-02 DIAGNOSIS — M35.00 SICCA, UNSPECIFIED TYPE: ICD-10-CM

## 2023-03-02 DIAGNOSIS — G47.00 INSOMNIA, UNSPECIFIED TYPE: ICD-10-CM

## 2023-03-02 DIAGNOSIS — M79.7 FIBROMYALGIA: ICD-10-CM

## 2023-03-02 DIAGNOSIS — M79.18 MYALGIA, MULTIPLE SITES: ICD-10-CM

## 2023-03-02 DIAGNOSIS — N94.6 DYSMENORRHEA: ICD-10-CM

## 2023-03-02 DIAGNOSIS — K21.9 GASTROESOPHAGEAL REFLUX DISEASE, UNSPECIFIED WHETHER ESOPHAGITIS PRESENT: ICD-10-CM

## 2023-03-02 DIAGNOSIS — M54.50 CHRONIC LOW BACK PAIN, UNSPECIFIED BACK PAIN LATERALITY, UNSPECIFIED WHETHER SCIATICA PRESENT: ICD-10-CM

## 2023-03-02 DIAGNOSIS — M79.7 FIBROMYALGIA: Primary | ICD-10-CM

## 2023-03-02 DIAGNOSIS — F98.8 ATTENTION DEFICIT DISORDER, UNSPECIFIED HYPERACTIVITY PRESENCE: ICD-10-CM

## 2023-03-02 DIAGNOSIS — G89.29 CHRONIC LOW BACK PAIN, UNSPECIFIED BACK PAIN LATERALITY, UNSPECIFIED WHETHER SCIATICA PRESENT: ICD-10-CM

## 2023-03-02 DIAGNOSIS — F43.10 PTSD (POST-TRAUMATIC STRESS DISORDER): ICD-10-CM

## 2023-03-02 DIAGNOSIS — F41.9 ANXIETY: ICD-10-CM

## 2023-03-02 PROCEDURE — 99999 PR PBB SHADOW E&M-EST. PATIENT-LVL IV: CPT | Mod: PBBFAC,,, | Performed by: INTERNAL MEDICINE

## 2023-03-02 PROCEDURE — 99204 PR OFFICE/OUTPT VISIT, NEW, LEVL IV, 45-59 MIN: ICD-10-PCS | Mod: S$PBB,,, | Performed by: INTERNAL MEDICINE

## 2023-03-02 PROCEDURE — 72200 XR SACROILIAC JOINTS 3 VIEWS: ICD-10-PCS | Mod: 26,,, | Performed by: RADIOLOGY

## 2023-03-02 PROCEDURE — 99214 OFFICE O/P EST MOD 30 MIN: CPT | Mod: PBBFAC | Performed by: INTERNAL MEDICINE

## 2023-03-02 PROCEDURE — 72200 X-RAY EXAM SI JOINTS: CPT | Mod: 26,,, | Performed by: RADIOLOGY

## 2023-03-02 PROCEDURE — 72200 X-RAY EXAM SI JOINTS: CPT | Mod: TC

## 2023-03-02 PROCEDURE — 99204 OFFICE O/P NEW MOD 45 MIN: CPT | Mod: S$PBB,,, | Performed by: INTERNAL MEDICINE

## 2023-03-02 PROCEDURE — 99999 PR PBB SHADOW E&M-EST. PATIENT-LVL IV: ICD-10-PCS | Mod: PBBFAC,,, | Performed by: INTERNAL MEDICINE

## 2023-03-02 RX ORDER — METHOCARBAMOL 500 MG/1
500 TABLET, FILM COATED ORAL NIGHTLY PRN
Qty: 40 TABLET | Refills: 0 | Status: SHIPPED | OUTPATIENT
Start: 2023-03-02 | End: 2023-03-12

## 2023-03-02 RX ORDER — PREGABALIN 25 MG/1
25 CAPSULE ORAL 2 TIMES DAILY
Qty: 60 CAPSULE | Refills: 6 | Status: SHIPPED | OUTPATIENT
Start: 2023-03-02 | End: 2023-04-10 | Stop reason: ALTCHOICE
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
## 2023-03-03 DIAGNOSIS — R73.9 HYPERGLYCEMIA: Primary | ICD-10-CM

## 2023-03-06 ENCOUNTER — LAB VISIT (OUTPATIENT)
Dept: LAB | Facility: HOSPITAL | Age: 33
End: 2023-03-06
Attending: FAMILY MEDICINE
Payer: OTHER GOVERNMENT

## 2023-03-06 DIAGNOSIS — R73.9 HYPERGLYCEMIA: ICD-10-CM

## 2023-03-06 PROCEDURE — 36415 COLL VENOUS BLD VENIPUNCTURE: CPT | Performed by: FAMILY MEDICINE

## 2023-03-06 PROCEDURE — 83036 HEMOGLOBIN GLYCOSYLATED A1C: CPT | Performed by: FAMILY MEDICINE

## 2023-03-07 LAB
ESTIMATED AVG GLUCOSE: 97 MG/DL (ref 68–131)
HBA1C MFR BLD: 5 % (ref 4–5.6)

## 2023-03-31 ENCOUNTER — HOSPITAL ENCOUNTER (OUTPATIENT)
Dept: RADIOLOGY | Facility: HOSPITAL | Age: 33
Discharge: HOME | End: 2023-03-31
Attending: INTERNAL MEDICINE
Payer: OTHER GOVERNMENT

## 2023-03-31 DIAGNOSIS — M54.50 CHRONIC LOW BACK PAIN, UNSPECIFIED BACK PAIN LATERALITY, UNSPECIFIED WHETHER SCIATICA PRESENT: ICD-10-CM

## 2023-03-31 DIAGNOSIS — G89.29 CHRONIC LOW BACK PAIN, UNSPECIFIED BACK PAIN LATERALITY, UNSPECIFIED WHETHER SCIATICA PRESENT: ICD-10-CM

## 2023-03-31 DIAGNOSIS — M47.819 SPONDYLOARTHROPATHY: ICD-10-CM

## 2023-03-31 PROCEDURE — 25500020 PHARM REV CODE 255: Performed by: INTERNAL MEDICINE

## 2023-03-31 PROCEDURE — 72197 MRI SACROILIAC JOINTS W W/O CONTRAST: ICD-10-PCS | Mod: 26,,, | Performed by: RADIOLOGY

## 2023-03-31 PROCEDURE — 72197 MRI PELVIS W/O & W/DYE: CPT | Mod: 26,,, | Performed by: RADIOLOGY

## 2023-03-31 PROCEDURE — 72197 MRI PELVIS W/O & W/DYE: CPT | Mod: TC

## 2023-03-31 PROCEDURE — A9585 GADOBUTROL INJECTION: HCPCS | Performed by: INTERNAL MEDICINE

## 2023-03-31 RX ORDER — GADOBUTROL 604.72 MG/ML
10 INJECTION INTRAVENOUS
Status: COMPLETED | OUTPATIENT
Start: 2023-03-31 | End: 2023-03-31

## 2023-03-31 RX ADMIN — GADOBUTROL 6 ML: 604.72 INJECTION INTRAVENOUS at 08:03

## 2023-04-03 ENCOUNTER — PATIENT MESSAGE (OUTPATIENT)
Dept: RHEUMATOLOGY | Facility: CLINIC | Age: 33
End: 2023-04-03
Payer: OTHER GOVERNMENT

## 2023-04-10 RX ORDER — GABAPENTIN 100 MG/1
100 CAPSULE ORAL 3 TIMES DAILY
Qty: 90 CAPSULE | Refills: 3 | Status: SHIPPED | OUTPATIENT
Start: 2023-04-10 | End: 2023-09-13 | Stop reason: SDUPTHER

## 2023-06-25 DIAGNOSIS — F98.8 ATTENTION DEFICIT DISORDER (ADD) WITHOUT HYPERACTIVITY: ICD-10-CM

## 2023-06-25 NOTE — TELEPHONE ENCOUNTER
No care due was identified.  Health [facility] Embedded Care Due Messages. Reference number: [id number].   6/25/2023 2:29:49 AM CDT

## 2023-06-26 NOTE — TELEPHONE ENCOUNTER
Refill Routing Note   Medication(s) are not appropriate for processing by [facility] for the following reason(s):      Medication outside of protocol : lisdexamfetamine (VYVANSE)    [facility] action(s):  Route None identified            Appointments  past 12m or future 3m with PCP    Date Provider   Last Visit   8/10/2022 Felicia Khan MD   Next Visit   8/10/2023 Felicia Khan MD   ED visits in past 90 days: 0        Note composed:9:24 AM 06/26/2023

## 2023-06-27 RX ORDER — LISDEXAMFETAMINE DIMESYLATE CAPSULES 20 MG/1
20 CAPSULE ORAL EVERY MORNING
Qty: 30 CAPSULE | Refills: 0 | Status: SHIPPED | OUTPATIENT
Start: 2023-06-27 | End: 2023-08-30

## 2023-08-30 ENCOUNTER — OFFICE VISIT (OUTPATIENT)
Dept: INTERNAL MEDICINE | Facility: CLINIC | Age: 33
End: 2023-08-30
Payer: OTHER GOVERNMENT

## 2023-08-30 VITALS
DIASTOLIC BLOOD PRESSURE: 62 MMHG | SYSTOLIC BLOOD PRESSURE: 116 MMHG | BODY MASS INDEX: 21.59 KG/M2 | HEART RATE: 97 BPM | WEIGHT: 142.44 LBS | RESPIRATION RATE: 18 BRPM | OXYGEN SATURATION: 98 % | TEMPERATURE: 98 F | HEIGHT: 68 IN

## 2023-08-30 DIAGNOSIS — F43.10 PTSD (POST-TRAUMATIC STRESS DISORDER): ICD-10-CM

## 2023-08-30 DIAGNOSIS — Z00.00 ROUTINE GENERAL MEDICAL EXAMINATION AT A HEALTH CARE FACILITY: Primary | ICD-10-CM

## 2023-08-30 DIAGNOSIS — Z13.220 SCREENING FOR LIPOID DISORDERS: ICD-10-CM

## 2023-08-30 DIAGNOSIS — F98.8 ATTENTION DEFICIT DISORDER (ADD) WITHOUT HYPERACTIVITY: Primary | ICD-10-CM

## 2023-08-30 DIAGNOSIS — Z13.29 THYROID DISORDER SCREEN: ICD-10-CM

## 2023-08-30 DIAGNOSIS — F98.8 ATTENTION DEFICIT DISORDER (ADD) WITHOUT HYPERACTIVITY: ICD-10-CM

## 2023-08-30 PROCEDURE — 99214 OFFICE O/P EST MOD 30 MIN: CPT | Mod: S$PBB,,, | Performed by: FAMILY MEDICINE

## 2023-08-30 PROCEDURE — 99999 PR PBB SHADOW E&M-EST. PATIENT-LVL V: ICD-10-PCS | Mod: PBBFAC,,, | Performed by: FAMILY MEDICINE

## 2023-08-30 PROCEDURE — 99215 OFFICE O/P EST HI 40 MIN: CPT | Mod: PBBFAC | Performed by: FAMILY MEDICINE

## 2023-08-30 PROCEDURE — 99214 PR OFFICE/OUTPT VISIT, EST, LEVL IV, 30-39 MIN: ICD-10-PCS | Mod: S$PBB,,, | Performed by: FAMILY MEDICINE

## 2023-08-30 PROCEDURE — 99999 PR PBB SHADOW E&M-EST. PATIENT-LVL V: CPT | Mod: PBBFAC,,, | Performed by: FAMILY MEDICINE

## 2023-08-30 NOTE — PROGRESS NOTES
Subjective:       Patient ID: Idalia Hooper is a 32 y.o. female.    Chief Complaint: ADHD    Patient presents to clinic today for followup of ADHD. Desires to decrease dose of medication, reporting medication decreases her appetite and sleep. She also desires to see psychiatry to consider autism spectrum disorders. Patient is otherwise without concerns today.       Review of Systems   Constitutional:  Negative for chills, fatigue, fever and unexpected weight change.   Eyes:  Negative for visual disturbance.   Respiratory:  Negative for shortness of breath.    Cardiovascular:  Negative for chest pain.   Musculoskeletal:  Negative for myalgias.   Neurological:  Negative for headaches.         Objective:      Physical Exam  Vitals reviewed.   Constitutional:       General: She is not in acute distress.     Appearance: She is well-developed.   HENT:      Head: Normocephalic and atraumatic.   Eyes:      General: Lids are normal. No scleral icterus.     Extraocular Movements: Extraocular movements intact.      Conjunctiva/sclera: Conjunctivae normal.      Pupils: Pupils are equal, round, and reactive to light.   Pulmonary:      Effort: Pulmonary effort is normal.   Neurological:      Mental Status: She is alert and oriented to person, place, and time.      Cranial Nerves: No cranial nerve deficit.      Gait: Gait normal.   Psychiatric:         Mood and Affect: Mood and affect normal.         Assessment:       1. Attention deficit disorder (ADD) without hyperactivity    2. PTSD (post-traumatic stress disorder)        Plan:   1. Attention deficit disorder (ADD) without hyperactivity  Assessment & Plan:  Decrease vyvanse to 10 mg daily    Orders:  -     lisdexamfetamine (VYVANSE) 10 mg Cap; Take 10 mg by mouth every morning.  Dispense: 30 capsule; Refill: 0  -     Ambulatory referral/consult to Psychiatry; Future; Expected date: 09/06/2023    2. PTSD (post-traumatic stress disorder)  -     Ambulatory referral/consult  to Psychiatry; Future; Expected date: 09/06/2023

## 2023-09-03 RX ORDER — LISDEXAMFETAMINE DIMESYLATE CAPSULES 10 MG/1
10 CAPSULE ORAL EVERY MORNING
Qty: 30 CAPSULE | Refills: 0 | Status: SHIPPED | OUTPATIENT
Start: 2023-09-03 | End: 2023-10-17 | Stop reason: SDUPTHER

## 2023-09-13 ENCOUNTER — PATIENT MESSAGE (OUTPATIENT)
Dept: RHEUMATOLOGY | Facility: CLINIC | Age: 33
End: 2023-09-13

## 2023-09-13 ENCOUNTER — OFFICE VISIT (OUTPATIENT)
Dept: RHEUMATOLOGY | Facility: CLINIC | Age: 33
End: 2023-09-13
Payer: OTHER GOVERNMENT

## 2023-09-13 VITALS
WEIGHT: 144.38 LBS | HEART RATE: 78 BPM | BODY MASS INDEX: 21.88 KG/M2 | SYSTOLIC BLOOD PRESSURE: 129 MMHG | HEIGHT: 68 IN | DIASTOLIC BLOOD PRESSURE: 67 MMHG

## 2023-09-13 DIAGNOSIS — F98.8 ATTENTION DEFICIT DISORDER, UNSPECIFIED HYPERACTIVITY PRESENCE: ICD-10-CM

## 2023-09-13 DIAGNOSIS — M79.7 FIBROMYALGIA: Primary | ICD-10-CM

## 2023-09-13 DIAGNOSIS — Z51.81 MEDICATION MONITORING ENCOUNTER: ICD-10-CM

## 2023-09-13 DIAGNOSIS — F43.10 PTSD (POST-TRAUMATIC STRESS DISORDER): ICD-10-CM

## 2023-09-13 DIAGNOSIS — M25.50 HYPERMOBILITY ARTHRALGIA: ICD-10-CM

## 2023-09-13 DIAGNOSIS — G89.29 CHRONIC LOW BACK PAIN, UNSPECIFIED BACK PAIN LATERALITY, UNSPECIFIED WHETHER SCIATICA PRESENT: ICD-10-CM

## 2023-09-13 DIAGNOSIS — F41.9 ANXIETY: ICD-10-CM

## 2023-09-13 DIAGNOSIS — M54.50 CHRONIC LOW BACK PAIN, UNSPECIFIED BACK PAIN LATERALITY, UNSPECIFIED WHETHER SCIATICA PRESENT: ICD-10-CM

## 2023-09-13 PROCEDURE — 99999 PR PBB SHADOW E&M-EST. PATIENT-LVL IV: CPT | Mod: PBBFAC,,, | Performed by: INTERNAL MEDICINE

## 2023-09-13 PROCEDURE — 99215 PR OFFICE/OUTPT VISIT, EST, LEVL V, 40-54 MIN: ICD-10-PCS | Mod: S$PBB,,, | Performed by: INTERNAL MEDICINE

## 2023-09-13 PROCEDURE — 99999 PR PBB SHADOW E&M-EST. PATIENT-LVL IV: ICD-10-PCS | Mod: PBBFAC,,, | Performed by: INTERNAL MEDICINE

## 2023-09-13 PROCEDURE — 99215 OFFICE O/P EST HI 40 MIN: CPT | Mod: S$PBB,,, | Performed by: INTERNAL MEDICINE

## 2023-09-13 PROCEDURE — 99214 OFFICE O/P EST MOD 30 MIN: CPT | Mod: PBBFAC | Performed by: INTERNAL MEDICINE

## 2023-09-13 RX ORDER — GABAPENTIN 100 MG/1
100 CAPSULE ORAL 3 TIMES DAILY
Qty: 270 CAPSULE | Refills: 1 | Status: SHIPPED | OUTPATIENT
Start: 2023-09-13 | End: 2024-03-11

## 2023-09-13 RX ORDER — METHOCARBAMOL 500 MG/1
500 TABLET, FILM COATED ORAL NIGHTLY PRN
Qty: 40 TABLET | Refills: 0 | Status: SHIPPED | OUTPATIENT
Start: 2023-09-13 | End: 2023-09-23
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
## 2023-10-01 ENCOUNTER — PATIENT MESSAGE (OUTPATIENT)
Dept: INTERNAL MEDICINE | Facility: CLINIC | Age: 33
End: 2023-10-01
Payer: OTHER GOVERNMENT

## 2023-10-17 DIAGNOSIS — F98.8 ATTENTION DEFICIT DISORDER (ADD) WITHOUT HYPERACTIVITY: ICD-10-CM

## 2023-10-17 RX ORDER — LISDEXAMFETAMINE DIMESYLATE CAPSULES 10 MG/1
10 CAPSULE ORAL EVERY MORNING
Qty: 30 CAPSULE | Refills: 0 | Status: SHIPPED | OUTPATIENT
Start: 2023-10-17

## 2023-10-17 NOTE — TELEPHONE ENCOUNTER
No care due was identified.  [facility] Embedded Care Due Messages. Reference number: [id number].   10/17/2023 12:40:29 PM CDT

## 2024-02-28 ENCOUNTER — TELEPHONE (OUTPATIENT)
Dept: RHEUMATOLOGY | Facility: CLINIC | Age: 34
End: 2024-02-28
Payer: OTHER GOVERNMENT

## 2024-02-28 NOTE — TELEPHONE ENCOUNTER
Left message for patient to call regarding 4-3 appointment with Dr. Blue. He will be out and appointment and labs need to be rescheduled

## 2024-03-05 ENCOUNTER — OFFICE VISIT (OUTPATIENT)
Dept: INTERNAL MEDICINE | Facility: CLINIC | Age: 34
End: 2024-03-05
Payer: OTHER GOVERNMENT

## 2024-03-05 VITALS
SYSTOLIC BLOOD PRESSURE: 118 MMHG | OXYGEN SATURATION: 99 % | DIASTOLIC BLOOD PRESSURE: 82 MMHG | WEIGHT: 139.88 LBS | TEMPERATURE: 98 F | HEART RATE: 93 BPM | BODY MASS INDEX: 21.27 KG/M2

## 2024-03-05 DIAGNOSIS — F98.8 ATTENTION DEFICIT DISORDER, UNSPECIFIED HYPERACTIVITY PRESENCE: ICD-10-CM

## 2024-03-05 DIAGNOSIS — F43.10 PTSD (POST-TRAUMATIC STRESS DISORDER): ICD-10-CM

## 2024-03-05 DIAGNOSIS — Z00.00 ROUTINE GENERAL MEDICAL EXAMINATION AT A HEALTH CARE FACILITY: Primary | ICD-10-CM

## 2024-03-05 DIAGNOSIS — J45.20 MILD INTERMITTENT ASTHMA WITHOUT COMPLICATION: ICD-10-CM

## 2024-03-05 DIAGNOSIS — J22 LOWER RESP. TRACT INFECTION: ICD-10-CM

## 2024-03-05 DIAGNOSIS — F41.9 ANXIETY: ICD-10-CM

## 2024-03-05 PROCEDURE — 99999 PR PBB SHADOW E&M-EST. PATIENT-LVL IV: CPT | Mod: PBBFAC,,, | Performed by: PHYSICIAN ASSISTANT

## 2024-03-05 PROCEDURE — 99395 PREV VISIT EST AGE 18-39: CPT | Mod: S$PBB,,, | Performed by: PHYSICIAN ASSISTANT

## 2024-03-05 PROCEDURE — 99214 OFFICE O/P EST MOD 30 MIN: CPT | Mod: PBBFAC | Performed by: PHYSICIAN ASSISTANT

## 2024-03-05 RX ORDER — PROMETHAZINE HYDROCHLORIDE AND DEXTROMETHORPHAN HYDROBROMIDE 6.25; 15 MG/5ML; MG/5ML
5 SYRUP ORAL EVERY 6 HOURS PRN
Qty: 118 ML | Refills: 0 | Status: SHIPPED | OUTPATIENT
Start: 2024-03-05 | End: 2024-05-14

## 2024-03-05 RX ORDER — AZELASTINE 1 MG/ML
1 SPRAY, METERED NASAL 2 TIMES DAILY
Qty: 30 ML | Refills: 0 | Status: SHIPPED | OUTPATIENT
Start: 2024-03-05

## 2024-03-05 RX ORDER — AZITHROMYCIN 250 MG/1
TABLET, FILM COATED ORAL
Qty: 6 TABLET | Refills: 0 | Status: SHIPPED | OUTPATIENT
Start: 2024-03-05 | End: 2024-05-14

## 2024-03-05 RX ORDER — ALBUTEROL SULFATE 90 UG/1
2 AEROSOL, METERED RESPIRATORY (INHALATION) EVERY 4 HOURS PRN
Qty: 18 G | Refills: 2 | Status: SHIPPED | OUTPATIENT
Start: 2024-03-05

## 2024-03-05 NOTE — PATIENT INSTRUCTIONS
Get your flu vaccine this Fall.     1. Drink plenty of fluids  2. Get plenty of rest.  3. Take Tylenol as directed on package for pain/fever. Do not take more than package suggests to take.   4. Go to Emergency Department if your symptoms worsen.  5. Follow up with your PCP in 1 week if symptoms persist.

## 2024-03-05 NOTE — PROGRESS NOTES
Subjective:       Patient ID: Idalia Hooper is a 33 y.o. female.    Chief Complaint: Annual Exam      Patient presents to clinic today for annual physical exam.        Review of Systems   Constitutional:  Positive for chills (Acute, 1 week), fatigue (Acute, 1 week) and fever (Acute, 1 week). Negative for unexpected weight change.   HENT:  Positive for congestion (Acute, 1 week) and rhinorrhea (Acute, 1 week). Negative for dental problem, ear pain, hearing loss and trouble swallowing.    Eyes:  Negative for pain and visual disturbance.   Respiratory:  Positive for cough (Acute, 1 week) and wheezing (Acute, 1 week). Negative for shortness of breath.    Cardiovascular:  Negative for chest pain, palpitations and leg swelling.   Gastrointestinal:  Negative for abdominal distention, abdominal pain, blood in stool, constipation, diarrhea, nausea and vomiting.   Genitourinary:  Negative for difficulty urinating and vaginal discharge.   Musculoskeletal:  Negative for arthralgias and myalgias.   Skin:  Negative for rash.   Neurological:  Positive for dizziness and headaches (Chronic, intermittent). Negative for weakness and numbness.   Hematological:  Negative for adenopathy. Does not bruise/bleed easily.   Psychiatric/Behavioral:  Positive for dysphoric mood (ongoing) and sleep disturbance (ongoing). The patient is nervous/anxious (Ongoing).        Objective:      Physical Exam  Vitals and nursing note reviewed.   Constitutional:       General: She is not in acute distress.     Appearance: Normal appearance. She is well-developed.   HENT:      Head: Normocephalic and atraumatic.      Right Ear: Tympanic membrane, ear canal and external ear normal.      Left Ear: Tympanic membrane, ear canal and external ear normal.      Nose: Nose normal. No mucosal edema or rhinorrhea.      Mouth/Throat:      Lips: Pink.      Mouth: Mucous membranes are moist.      Pharynx: Oropharynx is clear. Uvula midline.   Eyes:      General:  Lids are normal. No scleral icterus.     Extraocular Movements: Extraocular movements intact.      Conjunctiva/sclera: Conjunctivae normal.      Pupils: Pupils are equal, round, and reactive to light.   Neck:      Thyroid: No thyromegaly.   Cardiovascular:      Rate and Rhythm: Normal rate and regular rhythm.      Pulses: Normal pulses.   Pulmonary:      Effort: Pulmonary effort is normal.      Breath sounds: Normal breath sounds. No wheezing, rhonchi or rales.   Abdominal:      General: Bowel sounds are normal. There is no distension.      Palpations: Abdomen is soft. There is no mass.      Tenderness: There is no abdominal tenderness.   Musculoskeletal:         General: Normal range of motion.      Cervical back: Normal range of motion and neck supple.      Right lower leg: No edema.      Left lower leg: No edema.   Lymphadenopathy:      Cervical: No cervical adenopathy.   Skin:     General: Skin is warm and dry.      Findings: No lesion or rash.      Nails: There is no clubbing.   Neurological:      Mental Status: She is alert.      Cranial Nerves: No cranial nerve deficit.      Sensory: No sensory deficit.   Psychiatric:         Mood and Affect: Mood and affect normal.         Assessment:       1. Routine general medical examination at a health care facility    2. Mild intermittent asthma without complication    3. PTSD (post-traumatic stress disorder)    4. Anxiety    5. Attention deficit disorder, unspecified hyperactivity presence    6. Lower resp. tract infection        Plan:   1. Routine general medical examination at a health care facility    2. Mild intermittent asthma without complication  Overview:  Stable on albuterol p.r.n.    Orders:  -     albuterol (PROVENTIL/VENTOLIN HFA) 90 mcg/actuation inhaler; Inhale 2 puffs into the lungs every 4 (four) hours as needed for Wheezing.  Dispense: 18 g; Refill: 2    3. PTSD (post-traumatic stress disorder)  Overview:  Stable on Zoloft      4.  Anxiety  Overview:  Stable on Zoloft      5. Attention deficit disorder, unspecified hyperactivity presence  Overview:  Stable on Vyvanse      6. Lower resp. tract infection  -     azithromycin (Z-MARTITA) 250 MG tablet; Take 2 tablets by mouth on day 1; Take 1 tablet by mouth on days 2-5  Dispense: 6 tablet; Refill: 0  -     promethazine-dextromethorphan (PROMETHAZINE-DM) 6.25-15 mg/5 mL Syrp; Take 5 mLs by mouth every 6 (six) hours as needed (cough).  Dispense: 118 mL; Refill: 0  -     azelastine (ASTELIN) 137 mcg (0.1 %) nasal spray; 1 spray (137 mcg total) by Nasal route 2 (two) times daily.  Dispense: 30 mL; Refill: 0        Fasting labs ASAP  6 month f/u with Dr. Khan scheduled  Health Maintenance reviewed/updated.

## 2024-03-11 ENCOUNTER — LAB VISIT (OUTPATIENT)
Dept: LAB | Facility: HOSPITAL | Age: 34
End: 2024-03-11
Attending: FAMILY MEDICINE
Payer: OTHER GOVERNMENT

## 2024-03-11 DIAGNOSIS — Z00.00 ROUTINE GENERAL MEDICAL EXAMINATION AT A HEALTH CARE FACILITY: ICD-10-CM

## 2024-03-11 DIAGNOSIS — Z13.29 THYROID DISORDER SCREEN: ICD-10-CM

## 2024-03-11 DIAGNOSIS — Z13.220 SCREENING FOR LIPOID DISORDERS: ICD-10-CM

## 2024-03-11 LAB
ALBUMIN SERPL BCP-MCNC: 4.3 G/DL (ref 3.5–5.2)
ALP SERPL-CCNC: 63 U/L (ref 55–135)
ALT SERPL W/O P-5'-P-CCNC: 17 U/L (ref 10–44)
ANION GAP SERPL CALC-SCNC: 9 MMOL/L (ref 8–16)
AST SERPL-CCNC: 16 U/L (ref 10–40)
BASOPHILS # BLD AUTO: 0.03 K/UL (ref 0–0.2)
BASOPHILS NFR BLD: 0.5 % (ref 0–1.9)
BILIRUB SERPL-MCNC: 0.7 MG/DL (ref 0.1–1)
BUN SERPL-MCNC: 8 MG/DL (ref 6–20)
CALCIUM SERPL-MCNC: 10 MG/DL (ref 8.7–10.5)
CHLORIDE SERPL-SCNC: 110 MMOL/L (ref 95–110)
CHOLEST SERPL-MCNC: 109 MG/DL (ref 120–199)
CHOLEST/HDLC SERPL: 2.7 {RATIO} (ref 2–5)
CO2 SERPL-SCNC: 22 MMOL/L (ref 23–29)
CREAT SERPL-MCNC: 0.8 MG/DL (ref 0.5–1.4)
DIFFERENTIAL METHOD BLD: NORMAL
EOSINOPHIL # BLD AUTO: 0.1 K/UL (ref 0–0.5)
EOSINOPHIL NFR BLD: 2.3 % (ref 0–8)
ERYTHROCYTE [DISTWIDTH] IN BLOOD BY AUTOMATED COUNT: 12.2 % (ref 11.5–14.5)
EST. GFR  (NO RACE VARIABLE): >60 ML/MIN/1.73 M^2
GLUCOSE SERPL-MCNC: 90 MG/DL (ref 70–110)
HCT VFR BLD AUTO: 43.7 % (ref 37–48.5)
HDLC SERPL-MCNC: 41 MG/DL (ref 40–75)
HDLC SERPL: 37.6 % (ref 20–50)
HGB BLD-MCNC: 14.7 G/DL (ref 12–16)
IMM GRANULOCYTES # BLD AUTO: 0.01 K/UL (ref 0–0.04)
IMM GRANULOCYTES NFR BLD AUTO: 0.2 % (ref 0–0.5)
LDLC SERPL CALC-MCNC: 58.8 MG/DL (ref 63–159)
LYMPHOCYTES # BLD AUTO: 2 K/UL (ref 1–4.8)
LYMPHOCYTES NFR BLD: 36.3 % (ref 18–48)
MCH RBC QN AUTO: 30.4 PG (ref 27–31)
MCHC RBC AUTO-ENTMCNC: 33.6 G/DL (ref 32–36)
MCV RBC AUTO: 91 FL (ref 82–98)
MONOCYTES # BLD AUTO: 0.4 K/UL (ref 0.3–1)
MONOCYTES NFR BLD: 6.4 % (ref 4–15)
NEUTROPHILS # BLD AUTO: 3 K/UL (ref 1.8–7.7)
NEUTROPHILS NFR BLD: 54.3 % (ref 38–73)
NONHDLC SERPL-MCNC: 68 MG/DL
NRBC BLD-RTO: 0 /100 WBC
PLATELET # BLD AUTO: 198 K/UL (ref 150–450)
PMV BLD AUTO: 11 FL (ref 9.2–12.9)
POTASSIUM SERPL-SCNC: 5 MMOL/L (ref 3.5–5.1)
PROT SERPL-MCNC: 7.3 G/DL (ref 6–8.4)
RBC # BLD AUTO: 4.83 M/UL (ref 4–5.4)
SODIUM SERPL-SCNC: 141 MMOL/L (ref 136–145)
TRIGL SERPL-MCNC: 46 MG/DL (ref 30–150)
TSH SERPL DL<=0.005 MIU/L-ACNC: 0.59 UIU/ML (ref 0.4–4)
WBC # BLD AUTO: 5.6 K/UL (ref 3.9–12.7)

## 2024-03-11 PROCEDURE — 80053 COMPREHEN METABOLIC PANEL: CPT | Performed by: FAMILY MEDICINE

## 2024-03-11 PROCEDURE — 36415 COLL VENOUS BLD VENIPUNCTURE: CPT | Performed by: FAMILY MEDICINE

## 2024-03-11 PROCEDURE — 85025 COMPLETE CBC W/AUTO DIFF WBC: CPT | Performed by: FAMILY MEDICINE

## 2024-03-11 PROCEDURE — 80061 LIPID PANEL: CPT | Performed by: FAMILY MEDICINE

## 2024-03-11 PROCEDURE — 84443 ASSAY THYROID STIM HORMONE: CPT | Performed by: FAMILY MEDICINE

## 2024-03-13 ENCOUNTER — TELEPHONE (OUTPATIENT)
Dept: RHEUMATOLOGY | Facility: CLINIC | Age: 34
End: 2024-03-13
Payer: OTHER GOVERNMENT

## 2024-03-13 NOTE — TELEPHONE ENCOUNTER
Left message for patient to call regarding 4-3 appointment . Dr. Blue will be out and appointment needs to be rescheduled .

## 2024-03-15 ENCOUNTER — TELEPHONE (OUTPATIENT)
Dept: RHEUMATOLOGY | Facility: CLINIC | Age: 34
End: 2024-03-15
Payer: OTHER GOVERNMENT

## 2024-03-15 NOTE — TELEPHONE ENCOUNTER
Left message for patient to call regarding 4-3 appointment with Dr. Blue. He will be out of clinic and appointment needs to be rescheduled

## 2024-05-01 DIAGNOSIS — F98.8 ATTENTION DEFICIT DISORDER (ADD) WITHOUT HYPERACTIVITY: ICD-10-CM

## 2024-05-01 NOTE — TELEPHONE ENCOUNTER
No care due was identified.  [facility] Embedded Care Due Messages. Reference number: [id number].   5/01/2024 6:54:28 PM CDT

## 2024-05-02 RX ORDER — LISDEXAMFETAMINE DIMESYLATE CAPSULES 10 MG/1
10 CAPSULE ORAL EVERY MORNING
Qty: 30 CAPSULE | Refills: 0 | Status: SHIPPED | OUTPATIENT
Start: 2024-05-02 | End: 2024-06-10 | Stop reason: SDUPTHER

## 2024-05-13 PROCEDURE — 93010 ELECTROCARDIOGRAM REPORT: CPT | Mod: S$PBB,,, | Performed by: INTERNAL MEDICINE

## 2024-05-14 ENCOUNTER — HOSPITAL ENCOUNTER (OUTPATIENT)
Dept: RADIOLOGY | Facility: HOSPITAL | Age: 34
Discharge: HOME | End: 2024-05-14
Attending: FAMILY MEDICINE
Payer: OTHER GOVERNMENT

## 2024-05-14 ENCOUNTER — PATIENT MESSAGE (OUTPATIENT)
Dept: RHEUMATOLOGY | Facility: CLINIC | Age: 34
End: 2024-05-14
Payer: OTHER GOVERNMENT

## 2024-05-14 ENCOUNTER — HOSPITAL ENCOUNTER (OUTPATIENT)
Dept: CARDIOLOGY | Facility: HOSPITAL | Age: 34
Discharge: HOME | End: 2024-05-14
Attending: FAMILY MEDICINE
Payer: OTHER GOVERNMENT

## 2024-05-14 ENCOUNTER — OFFICE VISIT (OUTPATIENT)
Dept: INTERNAL MEDICINE | Facility: CLINIC | Age: 34
End: 2024-05-14
Payer: OTHER GOVERNMENT

## 2024-05-14 VITALS
WEIGHT: 148.56 LBS | OXYGEN SATURATION: 99 % | SYSTOLIC BLOOD PRESSURE: 114 MMHG | DIASTOLIC BLOOD PRESSURE: 72 MMHG | BODY MASS INDEX: 22.52 KG/M2 | HEIGHT: 68 IN | TEMPERATURE: 98 F | HEART RATE: 81 BPM

## 2024-05-14 DIAGNOSIS — R07.9 CHEST PAIN, UNSPECIFIED TYPE: Primary | ICD-10-CM

## 2024-05-14 DIAGNOSIS — R07.9 CHEST PAIN, UNSPECIFIED TYPE: ICD-10-CM

## 2024-05-14 LAB
OHS QRS DURATION: 74 MS
OHS QTC CALCULATION: 405 MS

## 2024-05-14 PROCEDURE — 93005 ELECTROCARDIOGRAM TRACING: CPT | Mod: PBBFAC | Performed by: INTERNAL MEDICINE

## 2024-05-14 PROCEDURE — 71046 X-RAY EXAM CHEST 2 VIEWS: CPT | Mod: TC

## 2024-05-14 PROCEDURE — G2211 COMPLEX E/M VISIT ADD ON: HCPCS | Mod: S$PBB,,, | Performed by: FAMILY MEDICINE

## 2024-05-14 PROCEDURE — 99214 OFFICE O/P EST MOD 30 MIN: CPT | Mod: S$PBB,,, | Performed by: FAMILY MEDICINE

## 2024-05-14 PROCEDURE — 99999 PR PBB SHADOW E&M-EST. PATIENT-LVL IV: CPT | Mod: PBBFAC,,, | Performed by: FAMILY MEDICINE

## 2024-05-14 PROCEDURE — 99214 OFFICE O/P EST MOD 30 MIN: CPT | Mod: PBBFAC,25 | Performed by: FAMILY MEDICINE

## 2024-05-14 PROCEDURE — 71046 X-RAY EXAM CHEST 2 VIEWS: CPT | Mod: 26,,, | Performed by: RADIOLOGY

## 2024-05-14 RX ORDER — NAPROXEN 500 MG/1
500 TABLET ORAL 2 TIMES DAILY
Qty: 28 TABLET | Refills: 0 | Status: SHIPPED | OUTPATIENT
Start: 2024-05-14 | End: 2024-05-28

## 2024-05-14 RX ORDER — CYCLOBENZAPRINE HCL 5 MG
5-10 TABLET ORAL 3 TIMES DAILY PRN
Qty: 30 TABLET | Refills: 1 | Status: SHIPPED | OUTPATIENT
Start: 2024-05-14 | End: 2024-05-24
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
## 2024-05-22 ENCOUNTER — OFFICE VISIT (OUTPATIENT)
Dept: RHEUMATOLOGY | Facility: CLINIC | Age: 34
End: 2024-05-22
Payer: OTHER GOVERNMENT

## 2024-05-22 DIAGNOSIS — F41.9 ANXIETY: ICD-10-CM

## 2024-05-22 DIAGNOSIS — M79.7 FIBROMYALGIA: ICD-10-CM

## 2024-05-22 DIAGNOSIS — M85.38 OSTEITIS CONDENSANS ILII: Primary | ICD-10-CM

## 2024-05-22 DIAGNOSIS — E55.9 VITAMIN D INSUFFICIENCY: ICD-10-CM

## 2024-05-22 DIAGNOSIS — Z71.89 COUNSELING ON HEALTH PROMOTION AND DISEASE PREVENTION: ICD-10-CM

## 2024-05-22 PROCEDURE — 99214 OFFICE O/P EST MOD 30 MIN: CPT | Mod: 95,,, | Performed by: INTERNAL MEDICINE
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
## 2024-06-10 DIAGNOSIS — F98.8 ATTENTION DEFICIT DISORDER (ADD) WITHOUT HYPERACTIVITY: ICD-10-CM

## 2024-06-11 RX ORDER — LISDEXAMFETAMINE DIMESYLATE CAPSULES 10 MG/1
10 CAPSULE ORAL EVERY MORNING
Qty: 30 CAPSULE | Refills: 0 | Status: SHIPPED | OUTPATIENT
Start: 2024-06-11

## 2024-09-16 ENCOUNTER — OFFICE VISIT (OUTPATIENT)
Dept: INTERNAL MEDICINE | Facility: CLINIC | Age: 34
End: 2024-09-16
Payer: OTHER GOVERNMENT

## 2024-09-16 VITALS
SYSTOLIC BLOOD PRESSURE: 112 MMHG | BODY MASS INDEX: 24 KG/M2 | HEIGHT: 68 IN | WEIGHT: 158.38 LBS | TEMPERATURE: 98 F | HEART RATE: 80 BPM | DIASTOLIC BLOOD PRESSURE: 72 MMHG | OXYGEN SATURATION: 98 %

## 2024-09-16 DIAGNOSIS — T63.461A ALLERGIC REACTION TO WASP STING: Primary | ICD-10-CM

## 2024-09-16 DIAGNOSIS — F98.8 ATTENTION DEFICIT DISORDER (ADD) WITHOUT HYPERACTIVITY: ICD-10-CM

## 2024-09-16 PROCEDURE — 99215 OFFICE O/P EST HI 40 MIN: CPT | Mod: PBBFAC | Performed by: FAMILY MEDICINE

## 2024-09-16 PROCEDURE — 99214 OFFICE O/P EST MOD 30 MIN: CPT | Mod: S$PBB,,, | Performed by: FAMILY MEDICINE

## 2024-09-16 PROCEDURE — 99999 PR PBB SHADOW E&M-EST. PATIENT-LVL V: CPT | Mod: PBBFAC,,, | Performed by: FAMILY MEDICINE

## 2024-09-16 RX ORDER — LISDEXAMFETAMINE DIMESYLATE 10 MG/1
10 CAPSULE ORAL EVERY MORNING
Qty: 30 CAPSULE | Refills: 0 | Status: SHIPPED | OUTPATIENT
Start: 2024-09-16

## 2024-09-16 NOTE — PROGRESS NOTES
Subjective:       Patient ID: Idalia Hooper is a 33 y.o. female.    CHIEF COMPLAINT:  - Follow-up on medication management  - Discuss concerns about wasp stings    HPI:  - Stung by a wasp recently, last week.  - Third consecutive summer with a wasp sting, with reactions progressively worsening  - Most recent sting on leg resulted in significant swelling of site  - Initial 24 hours post-sting were particularly severe, with visible swelling and discoloration of the affected area  - Concern about potential future stings, particularly if they were to occur from the neck up  - History of bee stings which did not produce similar reactions to the wasp stings  - Adequate management of ADHD symptoms with Vyvanse  - Regained all previously lost weight due to medication  - Dosage of Vyvanse reduced from 20mg to 10mg due to side effects such as hyperactivity and difficulty eating  - Current dose effectively manages ADHD symptoms, but has resulted in weight regain  - Denies any respiratory symptoms, throat swelling associated with the wasp sting, or history of anaphylaxis  Patient is otherwise without concerns today.      Review of Systems   Constitutional:  Negative for activity change and unexpected weight change.   HENT:  Negative for hearing loss, rhinorrhea and trouble swallowing.    Eyes:  Negative for discharge and visual disturbance.   Respiratory:  Negative for chest tightness and wheezing.    Cardiovascular:  Negative for chest pain and palpitations.   Gastrointestinal:  Negative for blood in stool, constipation, diarrhea and vomiting.   Endocrine: Negative for polydipsia and polyuria.   Genitourinary:  Negative for difficulty urinating, dysuria, hematuria and menstrual problem.   Musculoskeletal:  Negative for arthralgias, joint swelling and neck pain.   Neurological:  Negative for weakness and headaches.   Psychiatric/Behavioral:  Negative for confusion and dysphoric mood.          Objective:      Physical  Exam  Vitals reviewed.   Constitutional:       General: She is not in acute distress.     Appearance: She is well-developed.   HENT:      Head: Normocephalic and atraumatic.   Eyes:      General: Lids are normal. No scleral icterus.     Extraocular Movements: Extraocular movements intact.      Conjunctiva/sclera: Conjunctivae normal.      Pupils: Pupils are equal, round, and reactive to light.   Pulmonary:      Effort: Pulmonary effort is normal.   Skin:         Neurological:      Mental Status: She is alert and oriented to person, place, and time.      Cranial Nerves: No cranial nerve deficit.      Gait: Gait normal.   Psychiatric:         Mood and Affect: Mood and affect normal.         Assessment:       1. Allergic reaction to wasp sting    2. Attention deficit disorder (ADD) without hyperactivity        Plan:   1. Allergic reaction to wasp sting  -     Ambulatory referral/consult to Allergy; Future; Expected date: 09/23/2024    2. Attention deficit disorder (ADD) without hyperactivity  Overview:  Stable on Vyvanse    Orders:  -     lisdexamfetamine (VYVANSE) 10 mg Cap; Take 1 capsule (10 mg total) by mouth every morning.  Dispense: 30 capsule; Refill: 0      Assessment & Plan     Assessed patient's response to current Vyvanse 10mg for ADHD management   Evaluated local allergic reaction to wasp sting, noting increasing severity with each occurrence   Determined current reaction does not indicate anaphylaxis risk based on localized symptoms and absence of respiratory issues    WASP STING ALLERGY:   Noted the patient's history of wasp stings, with increasing severity of reactions over the past three summers.   Observed that the patient experienced swelling and color changes at the sting site, with symptoms lasting for at least 24 hours.   Evaluated the reaction as a local response to wasp sting, with no signs of anaphylaxis or respiratory symptoms.   Acknowledged the patient's concern about potential more severe  reactions in the future, especially if stung from the neck up.   Recommend cool compresses for managing local reactions to wasp stings.   Offered to refer the patient to an allergist for further evaluation and possible EpiPen prescription.   Advised the patient to seek immediate medical attention if experiencing severe reactions in the future.   Suggested preventive measures to reduce the risk of future wasp stings.   Explained that local reactions to wasp stings are expected and typically managed with cool compresses.   Educated on the importance of seeking immediate medical attention if stung in neck/face area with significant swelling.   Instructed the patient to take measures to reduce risk of wasp stings, such as avoiding known wasp-infested areas.    Patient expressed understanding and agreement with plan.    Health Maintenance reviewed/updated.    Follow up in 6 months (on 3/16/2025), or if symptoms worsen or fail to improve, for EPP/annual with Gail RAMIREZ.    This note was generated with the assistance of ambient listening technology. Verbal consent was obtained by the patient and accompanying visitor(s) for the recording of patient appointment to facilitate this note. I attest to having reviewed and edited the generated note for accuracy, though some syntax or spelling errors may persist. Please contact the author of this note for any clarification.

## 2024-09-17 ENCOUNTER — LAB VISIT (OUTPATIENT)
Dept: LAB | Facility: HOSPITAL | Age: 34
End: 2024-09-17
Attending: STUDENT IN AN ORGANIZED HEALTH CARE EDUCATION/TRAINING PROGRAM
Payer: OTHER GOVERNMENT

## 2024-09-17 ENCOUNTER — OFFICE VISIT (OUTPATIENT)
Dept: ALLERGY | Facility: CLINIC | Age: 34
End: 2024-09-17
Payer: OTHER GOVERNMENT

## 2024-09-17 VITALS
TEMPERATURE: 98 F | WEIGHT: 154.75 LBS | SYSTOLIC BLOOD PRESSURE: 115 MMHG | HEART RATE: 80 BPM | BODY MASS INDEX: 23.45 KG/M2 | RESPIRATION RATE: 20 BRPM | DIASTOLIC BLOOD PRESSURE: 67 MMHG | OXYGEN SATURATION: 98 % | HEIGHT: 68 IN

## 2024-09-17 DIAGNOSIS — J31.0 CHRONIC RHINITIS: Primary | ICD-10-CM
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
## 2024-09-17 DIAGNOSIS — J45.20 MILD INTERMITTENT ASTHMA WITHOUT COMPLICATION: ICD-10-CM

## 2024-09-17 DIAGNOSIS — Z91.038 HYMENOPTERA ALLERGY: ICD-10-CM

## 2024-09-17 DIAGNOSIS — T63.461A ALLERGIC REACTION TO WASP STING: ICD-10-CM

## 2024-09-17 DIAGNOSIS — L25.9 CONTACT DERMATITIS AND ECZEMA: ICD-10-CM

## 2024-09-17 DIAGNOSIS — J31.0 CHRONIC RHINITIS: ICD-10-CM

## 2024-09-17 PROCEDURE — 99204 OFFICE O/P NEW MOD 45 MIN: CPT | Mod: S$PBB,,, | Performed by: STUDENT IN AN ORGANIZED HEALTH CARE EDUCATION/TRAINING PROGRAM

## 2024-09-17 PROCEDURE — 86003 ALLG SPEC IGE CRUDE XTRC EA: CPT | Performed by: STUDENT IN AN ORGANIZED HEALTH CARE EDUCATION/TRAINING PROGRAM

## 2024-09-17 PROCEDURE — 36415 COLL VENOUS BLD VENIPUNCTURE: CPT | Performed by: STUDENT IN AN ORGANIZED HEALTH CARE EDUCATION/TRAINING PROGRAM

## 2024-09-17 PROCEDURE — 82785 ASSAY OF IGE: CPT | Performed by: STUDENT IN AN ORGANIZED HEALTH CARE EDUCATION/TRAINING PROGRAM

## 2024-09-17 PROCEDURE — 99999 PR PBB SHADOW E&M-EST. PATIENT-LVL IV: CPT | Mod: PBBFAC,,, | Performed by: STUDENT IN AN ORGANIZED HEALTH CARE EDUCATION/TRAINING PROGRAM

## 2024-09-17 PROCEDURE — 99214 OFFICE O/P EST MOD 30 MIN: CPT | Mod: PBBFAC | Performed by: STUDENT IN AN ORGANIZED HEALTH CARE EDUCATION/TRAINING PROGRAM

## 2024-09-17 PROCEDURE — 86003 ALLG SPEC IGE CRUDE XTRC EA: CPT | Mod: 59 | Performed by: STUDENT IN AN ORGANIZED HEALTH CARE EDUCATION/TRAINING PROGRAM

## 2024-09-17 RX ORDER — TRIAMCINOLONE ACETONIDE 1 MG/G
OINTMENT TOPICAL 2 TIMES DAILY
Qty: 80 G | Refills: 11 | Status: SHIPPED | OUTPATIENT
Start: 2024-09-17 | End: 2025-09-17
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
## 2024-09-18 PROBLEM — J31.0 CHRONIC RHINITIS: Status: ACTIVE | Noted: 2024-09-18

## 2024-09-18 PROBLEM — T63.461A ALLERGIC REACTION TO WASP STING: Status: ACTIVE | Noted: 2024-09-18

## 2024-09-18 PROBLEM — Z91.038 HYMENOPTERA ALLERGY: Status: ACTIVE | Noted: 2024-09-18

## 2024-09-18 PROBLEM — L25.9 CONTACT DERMATITIS AND ECZEMA: Status: ACTIVE | Noted: 2024-09-18

## 2024-09-18 NOTE — ASSESSMENT & PLAN NOTE
- Well controlled, no acute complaints  - Continue albuterol PRN  - Consider Spirometry at future appointment   - Educated on proper use of inhalers including an in-person demonstration of proper technique  - Expressed understanding of demonstrated technique  - ED precautions discussed at length   - Will continue to monitor and reassess

## 2024-09-18 NOTE — ASSESSMENT & PLAN NOTE
- No indication for EpiPen at this time   - Will ordered EpiPen for VIT if indicated  - Will continue to monitor and reassess
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
## 2024-09-18 NOTE — ASSESSMENT & PLAN NOTE
- Ordered Triamcinolone 0.1% ointment BID PRN   - Educated on environmental control and preventative strategies   - Will continue to monitor and reassess

## 2024-09-18 NOTE — ASSESSMENT & PLAN NOTE
- No major complaints   - Continue PRN medications   - Ordered Serum IgE to Zone 6 Aeroallergens   - Will continue to monitor and reassess

## 2024-09-18 NOTE — ASSESSMENT & PLAN NOTE
- Appears to have a Type IV reaction to Latex   - Will rule out IgE mediated process for risk stratification

## 2024-09-20 ENCOUNTER — TELEPHONE (OUTPATIENT)
Dept: ALLERGY | Facility: CLINIC | Age: 34
End: 2024-09-20
Payer: OTHER GOVERNMENT

## 2024-09-20 LAB
A ALTERNATA IGE QN: <0.1 KU/L
A FUMIGATUS IGE QN: <0.1 KU/L
ALLERGEN BOXELDER MAPLE TREE IGE: <0.1 KU/L
ALLERGEN LATEX IGE: <0.1 KU/L
ALLERGEN MULBERRY TREE IGE: <0.1 KU/L
ALLERGEN PIGWEED IGE: <0.1 KU/L
ALLERGEN WALNUT TREE IGE: <0.1 KU/L
BERMUDA GRASS IGE QN: 2.95 KU/L
C HERBARUM IGE QN: <0.1 KU/L
CAT DANDER IGE QN: 0.25 KU/L
COMMON RAGWEED IGE QN: 0.12 KU/L
D FARINAE IGE QN: 2.89 KU/L
D PTERONYSS IGE QN: 3.65 KU/L
DEPRECATED TIMOTHY IGE RAST QL: ABNORMAL
DOG DANDER IGE QN: 0.12 KU/L
ELDER IGE QN: <0.1 KU/L
HONEY BEE IGE QN: <0.1 KU/L
IGE: 60.1 IU/ML
MOUSE URINE PROT IGE QN: <0.1 KU/L
MT JUNIPER IGE QN: <0.1 KU/L
P NOTATUM IGE QN: <0.1 KU/L
PAPER WASP IGE QN: 5.49 KU/L
PECAN/HICK TREE IGE QN: <0.1 KU/L
RAST ALLERGEN INTERPRETATION: ABNORMAL
RAST CLASS: ABNORMAL
RAST CLASS: NORMAL
ROACH IGE QN: <0.1 KU/L
SILVER BIRCH IGE QN: <0.1 KU/L
TIMOTHY IGE QN: 9.33 KU/L
WHITE ELM IGE QN: <0.1 KU/L
WHITE OAK IGE QN: <0.1 KU/L
WHITEFACED HORNET IGE QN: 0.28 KU/L
YELLOW HORNET IGE QN: <0.1 KU/L
YELLOW JACKET IGE QN: 0.94 KU/L

## 2024-09-20 RX ORDER — MONTELUKAST SODIUM 10 MG/1
10 TABLET ORAL DAILY
Qty: 90 TABLET | Refills: 3 | Status: SHIPPED | OUTPATIENT
Start: 2024-09-20 | End: 2025-09-20

## 2024-09-20 RX ORDER — EPINEPHRINE 0.3 MG/.3ML
1 INJECTION SUBCUTANEOUS ONCE
Qty: 0.3 ML | Refills: 0 | Status: SHIPPED | OUTPATIENT
Start: 2024-09-20 | End: 2024-09-20

## 2024-09-20 NOTE — TELEPHONE ENCOUNTER
Left message to call and schedule venom shots.    ----- Message from Marivel Parsons MD sent at 9/20/2024  2:22 PM CDT -----  Regarding: VIT  Patient needs appointment for VIT. She will be receiving wasp and mixed vespid. I am ordering Montelukast for her take as well. Please let her know.     Dr. Parsons  
show

## 2024-09-24 ENCOUNTER — TELEPHONE (OUTPATIENT)
Dept: ALLERGY | Facility: CLINIC | Age: 34
End: 2024-09-24
Payer: OTHER GOVERNMENT

## 2024-09-30 ENCOUNTER — PATIENT MESSAGE (OUTPATIENT)
Dept: ALLERGY | Facility: CLINIC | Age: 34
End: 2024-09-30
Payer: OTHER GOVERNMENT

## 2024-10-03 ENCOUNTER — PATIENT MESSAGE (OUTPATIENT)
Dept: ALLERGY | Facility: CLINIC | Age: 34
End: 2024-10-03
Payer: OTHER GOVERNMENT

## 2024-10-10 ENCOUNTER — CLINICAL SUPPORT (OUTPATIENT)
Dept: ALLERGY | Facility: CLINIC | Age: 34
End: 2024-10-10
Payer: OTHER GOVERNMENT

## 2024-10-10 DIAGNOSIS — Z91.038 HYMENOPTERA ALLERGY: Primary | ICD-10-CM

## 2024-10-10 PROCEDURE — 99999 PR PBB SHADOW E&M-EST. PATIENT-LVL II: CPT | Mod: PBBFAC,,,

## 2024-10-10 NOTE — PROGRESS NOTES
See Flowsheet for  VENOM immunotherapy administration. Patient waited in clinic 30 min for observation. Pt had epi pen on hand.

## 2024-10-13 ENCOUNTER — HOSPITAL ENCOUNTER (EMERGENCY)
Facility: HOSPITAL | Age: 34
Discharge: HOME | End: 2024-10-13
Attending: FAMILY MEDICINE
Payer: OTHER GOVERNMENT

## 2024-10-13 VITALS
OXYGEN SATURATION: 100 % | DIASTOLIC BLOOD PRESSURE: 55 MMHG | HEART RATE: 72 BPM | BODY MASS INDEX: 24.54 KG/M2 | TEMPERATURE: 98 F | RESPIRATION RATE: 16 BRPM | SYSTOLIC BLOOD PRESSURE: 116 MMHG | WEIGHT: 161.38 LBS

## 2024-10-13 DIAGNOSIS — R55 SYNCOPE: Primary | ICD-10-CM

## 2024-10-13 LAB
ALBUMIN SERPL BCP-MCNC: 4 G/DL (ref 3.5–5.2)
ALP SERPL-CCNC: 64 U/L (ref 55–135)
ALT SERPL W/O P-5'-P-CCNC: 12 U/L (ref 10–44)
ANION GAP SERPL CALC-SCNC: 8 MMOL/L (ref 8–16)
AST SERPL-CCNC: 16 U/L (ref 10–40)
B-HCG UR QL: NEGATIVE
BASOPHILS # BLD AUTO: 0.05 K/UL (ref 0–0.2)
BASOPHILS NFR BLD: 0.8 % (ref 0–1.9)
BILIRUB SERPL-MCNC: 0.3 MG/DL (ref 0.1–1)
BILIRUB UR QL STRIP: NEGATIVE
BUN SERPL-MCNC: 11 MG/DL (ref 6–20)
CALCIUM SERPL-MCNC: 8.7 MG/DL (ref 8.7–10.5)
CHLORIDE SERPL-SCNC: 109 MMOL/L (ref 95–110)
CLARITY UR: CLEAR
CO2 SERPL-SCNC: 23 MMOL/L (ref 23–29)
COLOR UR: YELLOW
CREAT SERPL-MCNC: 0.7 MG/DL (ref 0.5–1.4)
DIFFERENTIAL METHOD BLD: ABNORMAL
EOSINOPHIL # BLD AUTO: 0.2 K/UL (ref 0–0.5)
EOSINOPHIL NFR BLD: 2.8 % (ref 0–8)
ERYTHROCYTE [DISTWIDTH] IN BLOOD BY AUTOMATED COUNT: 11.4 % (ref 11.5–14.5)
EST. GFR  (NO RACE VARIABLE): >60 ML/MIN/1.73 M^2
GLUCOSE SERPL-MCNC: 101 MG/DL (ref 70–110)
GLUCOSE UR QL STRIP: NEGATIVE
HCT VFR BLD AUTO: 39.3 % (ref 37–48.5)
HGB BLD-MCNC: 13.4 G/DL (ref 12–16)
HGB UR QL STRIP: ABNORMAL
IMM GRANULOCYTES # BLD AUTO: 0.02 K/UL (ref 0–0.04)
IMM GRANULOCYTES NFR BLD AUTO: 0.3 % (ref 0–0.5)
KETONES UR QL STRIP: NEGATIVE
LEUKOCYTE ESTERASE UR QL STRIP: NEGATIVE
LYMPHOCYTES # BLD AUTO: 1.5 K/UL (ref 1–4.8)
LYMPHOCYTES NFR BLD: 25.2 % (ref 18–48)
MCH RBC QN AUTO: 30.6 PG (ref 27–31)
MCHC RBC AUTO-ENTMCNC: 34.1 G/DL (ref 32–36)
MCV RBC AUTO: 90 FL (ref 82–98)
MICROSCOPIC COMMENT: NORMAL
MONOCYTES # BLD AUTO: 0.4 K/UL (ref 0.3–1)
MONOCYTES NFR BLD: 6.8 % (ref 4–15)
NEUTROPHILS # BLD AUTO: 3.9 K/UL (ref 1.8–7.7)
NEUTROPHILS NFR BLD: 64.1 % (ref 38–73)
NITRITE UR QL STRIP: NEGATIVE
NRBC BLD-RTO: 0 /100 WBC
PH UR STRIP: 8 [PH] (ref 5–8)
PLATELET # BLD AUTO: 188 K/UL (ref 150–450)
PMV BLD AUTO: 10.2 FL (ref 9.2–12.9)
POTASSIUM SERPL-SCNC: 4.1 MMOL/L (ref 3.5–5.1)
PROT SERPL-MCNC: 6.5 G/DL (ref 6–8.4)
PROT UR QL STRIP: ABNORMAL
RBC # BLD AUTO: 4.38 M/UL (ref 4–5.4)
RBC #/AREA URNS HPF: 1 /HPF (ref 0–4)
SODIUM SERPL-SCNC: 140 MMOL/L (ref 136–145)
SP GR UR STRIP: 1.02 (ref 1–1.03)
URN SPEC COLLECT METH UR: ABNORMAL
UROBILINOGEN UR STRIP-ACNC: NEGATIVE EU/DL
WBC # BLD AUTO: 6.02 K/UL (ref 3.9–12.7)

## 2024-10-13 PROCEDURE — 93005 ELECTROCARDIOGRAM TRACING: CPT

## 2024-10-13 PROCEDURE — 93010 ELECTROCARDIOGRAM REPORT: CPT | Mod: ,,, | Performed by: STUDENT IN AN ORGANIZED HEALTH CARE EDUCATION/TRAINING PROGRAM

## 2024-10-13 PROCEDURE — 99285 EMERGENCY DEPT VISIT HI MDM: CPT | Mod: 25

## 2024-10-13 PROCEDURE — 81025 URINE PREGNANCY TEST: CPT | Performed by: FAMILY MEDICINE

## 2024-10-13 PROCEDURE — 81000 URINALYSIS NONAUTO W/SCOPE: CPT | Performed by: FAMILY MEDICINE

## 2024-10-13 PROCEDURE — 85025 COMPLETE CBC W/AUTO DIFF WBC: CPT | Performed by: FAMILY MEDICINE

## 2024-10-13 PROCEDURE — 80053 COMPREHEN METABOLIC PANEL: CPT | Performed by: FAMILY MEDICINE

## 2024-10-13 RX ORDER — CYCLOBENZAPRINE HCL 5 MG
10 TABLET ORAL NIGHTLY
COMMUNITY

## 2024-10-13 RX ORDER — METRONIDAZOLE 500 MG/1
500 TABLET ORAL
COMMUNITY

## 2024-10-13 NOTE — ED NOTES
Pt. Aware that urine specimen needed for analysis when possible. Pt. Request for ice water to drink - okay per Dr. Foreman - provided.
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
## 2024-10-14 LAB
OHS QRS DURATION: 76 MS
OHS QTC CALCULATION: 416 MS

## 2024-10-17 ENCOUNTER — CLINICAL SUPPORT (OUTPATIENT)
Dept: ALLERGY | Facility: CLINIC | Age: 34
End: 2024-10-17
Payer: OTHER GOVERNMENT

## 2024-10-17 ENCOUNTER — OFFICE VISIT (OUTPATIENT)
Dept: INTERNAL MEDICINE | Facility: CLINIC | Age: 34
End: 2024-10-17
Payer: OTHER GOVERNMENT

## 2024-10-17 ENCOUNTER — OFFICE VISIT (OUTPATIENT)
Dept: CARDIOLOGY | Facility: CLINIC | Age: 34
End: 2024-10-17
Payer: OTHER GOVERNMENT

## 2024-10-17 VITALS
SYSTOLIC BLOOD PRESSURE: 120 MMHG | BODY MASS INDEX: 23.51 KG/M2 | DIASTOLIC BLOOD PRESSURE: 80 MMHG | WEIGHT: 154.63 LBS | OXYGEN SATURATION: 98 %

## 2024-10-17 VITALS
BODY MASS INDEX: 23.1 KG/M2 | OXYGEN SATURATION: 98 % | WEIGHT: 152.44 LBS | HEIGHT: 68 IN | HEART RATE: 70 BPM | SYSTOLIC BLOOD PRESSURE: 113 MMHG | DIASTOLIC BLOOD PRESSURE: 68 MMHG

## 2024-10-17 DIAGNOSIS — R55 SYNCOPE, UNSPECIFIED SYNCOPE TYPE: Primary | ICD-10-CM

## 2024-10-17 DIAGNOSIS — F41.9 ANXIETY: ICD-10-CM

## 2024-10-17 DIAGNOSIS — F98.8 ATTENTION DEFICIT DISORDER, UNSPECIFIED TYPE: ICD-10-CM

## 2024-10-17 DIAGNOSIS — R00.2 PALPITATIONS: Primary | ICD-10-CM

## 2024-10-17 DIAGNOSIS — K21.9 GASTROESOPHAGEAL REFLUX DISEASE, UNSPECIFIED WHETHER ESOPHAGITIS PRESENT: ICD-10-CM

## 2024-10-17 DIAGNOSIS — F43.10 PTSD (POST-TRAUMATIC STRESS DISORDER): ICD-10-CM

## 2024-10-17 DIAGNOSIS — Z91.038 HYMENOPTERA ALLERGY: Primary | ICD-10-CM

## 2024-10-17 DIAGNOSIS — Z86.16 HISTORY OF COVID-19: ICD-10-CM

## 2024-10-17 DIAGNOSIS — J45.901 ASTHMA WITH ACUTE EXACERBATION, UNSPECIFIED ASTHMA SEVERITY, UNSPECIFIED WHETHER PERSISTENT: ICD-10-CM

## 2024-10-17 DIAGNOSIS — J45.20 MILD INTERMITTENT ASTHMA WITHOUT COMPLICATION: ICD-10-CM

## 2024-10-17 DIAGNOSIS — R06.09 OTHER FORM OF DYSPNEA: ICD-10-CM

## 2024-10-17 DIAGNOSIS — R06.02 SHORTNESS OF BREATH: ICD-10-CM

## 2024-10-17 DIAGNOSIS — R55 SYNCOPE, UNSPECIFIED SYNCOPE TYPE: ICD-10-CM

## 2024-10-17 PROCEDURE — 99999 PR PBB SHADOW E&M-EST. PATIENT-LVL V: CPT | Mod: PBBFAC,,, | Performed by: PHYSICIAN ASSISTANT

## 2024-10-17 PROCEDURE — 99215 OFFICE O/P EST HI 40 MIN: CPT | Mod: PBBFAC,27 | Performed by: PHYSICIAN ASSISTANT

## 2024-10-17 PROCEDURE — 99204 OFFICE O/P NEW MOD 45 MIN: CPT | Mod: S$PBB,,, | Performed by: INTERNAL MEDICINE

## 2024-10-17 PROCEDURE — 95117 IMMUNOTHERAPY INJECTIONS: CPT | Mod: PBBFAC

## 2024-10-17 PROCEDURE — 95146 ANTIGEN THERAPY SERVICES: CPT | Mod: PBBFAC

## 2024-10-17 PROCEDURE — G2211 COMPLEX E/M VISIT ADD ON: HCPCS | Mod: S$PBB,,, | Performed by: INTERNAL MEDICINE

## 2024-10-17 PROCEDURE — 99999 PR PBB SHADOW E&M-EST. PATIENT-LVL I: CPT | Mod: PBBFAC,,,

## 2024-10-17 PROCEDURE — 99999 PR PBB SHADOW E&M-EST. PATIENT-LVL IV: CPT | Mod: PBBFAC,,, | Performed by: INTERNAL MEDICINE

## 2024-10-17 PROCEDURE — 99214 OFFICE O/P EST MOD 30 MIN: CPT | Mod: PBBFAC,25 | Performed by: INTERNAL MEDICINE

## 2024-10-17 RX ORDER — FAMOTIDINE 40 MG/1
40 TABLET, FILM COATED ORAL 2 TIMES DAILY PRN
Qty: 30 TABLET | Refills: 2 | Status: SHIPPED | OUTPATIENT
Start: 2024-10-17 | End: 2025-01-15

## 2024-10-17 RX ORDER — HYDROXYZINE HYDROCHLORIDE 25 MG/1
25 TABLET, FILM COATED ORAL 3 TIMES DAILY PRN
Qty: 45 TABLET | Refills: 1 | Status: SHIPPED | OUTPATIENT
Start: 2024-10-17 | End: 2024-11-16

## 2024-10-17 RX ORDER — SODIUM CHLORIDE 1 G/1
1000 TABLET ORAL 3 TIMES DAILY
Qty: 90 TABLET | Refills: 1 | Status: SHIPPED | OUTPATIENT
Start: 2024-10-17

## 2024-10-17 RX ORDER — MIDODRINE HYDROCHLORIDE 2.5 MG/1
2.5 TABLET ORAL 3 TIMES DAILY PRN
Qty: 360 TABLET | Refills: 1 | Status: SHIPPED | OUTPATIENT
Start: 2024-10-17 | End: 2025-10-17

## 2024-10-17 RX ORDER — BUDESONIDE AND FORMOTEROL FUMARATE DIHYDRATE 160; 4.5 UG/1; UG/1
2 AEROSOL RESPIRATORY (INHALATION) EVERY 12 HOURS
Qty: 6 G | Refills: 0 | Status: SHIPPED | OUTPATIENT
Start: 2024-10-17 | End: 2024-10-31

## 2024-10-17 RX ORDER — SERTRALINE HYDROCHLORIDE 50 MG/1
100 TABLET, FILM COATED ORAL DAILY
Qty: 30 TABLET | Refills: 10 | Status: SHIPPED | OUTPATIENT
Start: 2024-10-17
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
## 2024-10-17 NOTE — PROGRESS NOTES
Subjective:       Patient ID: Idalia Hooper is a 33 y.o. female.    Chief Complaint: Follow-up (ER /Chest pain feel something tighten in chest started Monday /Eye and jaw on left side of body hurting )    CHIEF COMPLAINT:  - Idalia presents with concerns about a recent syncopal episode and ongoing dizziness, as well as chest tightness and shortness of breath.    HPI:  - Idalia recently had a syncopal event on 10/13, losing consciousness after feeling dizzy while exiting the bathroom. Prior to the event, she had been reorganizing the bathroom, involving frequent positional changes. Upon standing to leave, she had visual obscuration described as a dark, web-like effect. She attempted to stabilize herself against the door frame before losing consciousness and waking on the floor. This incident resulted in an emergency room visit.  - Idlaia reports frequent dizziness, occurring weekly and sometimes multiple times per week, with palpitations. Palpitations also occur independently of dizziness. Any jumping activities, such as jump rope or trampoline use, induce severe dizziness and lightheadedness, leading to near-syncope.  - Idalia has chest tightness for several days, which has progressed to muscle spasms in her chest. The tightness follows a pattern of contraction and relaxation, with periods of relaxation lasting a few minutes before recontracting. This has resulted in soreness and difficulty breathing, particularly with deep inhalation. Breathing difficulties intensified this morning.  - Idalia has a history of similar muscle spasms in other body parts, including stomach, bladder, and recently her shoulder. She has a previous fibromyalgia diagnosis but questions its accuracy.  - Idalia has a history of childhood-diagnosed exercise-induced asthma but now believes symptoms may be more consistent with panic attacks. She describes severe chest tightness leading to panic, rather than breathing difficulties  precipitating panic.  - Idalia has been off her ADHD medication (Vyvanse) for a few weeks due to a recent sinus cold and the fall incident. When taking her ADHD medication, it induces a state of heightened activity, which she has been trying to avoid while recovering.  - Idalia believes many of symptoms align with POTS (Postural Orthostatic Tachycardia Syndrome), based on her research into ADHD and associated health conditions.  - Idalia denies current wheezing or typical asthma symptoms. Idalia denies any current diagnoses of POTS or other cardiac conditions.    IMAGING:  - CT: Recent (in ER), normal, no bleeds or contusions noted  EKG (in ER): NSR with sinus arrhythmia, cannot rule out anterior infarct-age undetermined. No STEMI.           Review of Systems   Constitutional:  Negative for chills, fatigue, fever and unexpected weight change.   HENT:  Negative for congestion, dental problem, ear pain, hearing loss, rhinorrhea and trouble swallowing.    Eyes:  Negative for pain and visual disturbance.   Respiratory:  Positive for chest tightness and shortness of breath. Negative for cough.    Cardiovascular:  Negative for chest pain, palpitations and leg swelling.   Gastrointestinal:  Positive for abdominal pain. Negative for abdominal distention, blood in stool, constipation, diarrhea, nausea and vomiting.   Genitourinary:  Negative for difficulty urinating and pelvic pain.   Musculoskeletal:  Negative for arthralgias and myalgias.   Skin:  Negative for rash.   Neurological:  Positive for dizziness and syncope. Negative for weakness, numbness and headaches.   Hematological:  Negative for adenopathy. Does not bruise/bleed easily.   Psychiatric/Behavioral:  Negative for dysphoric mood and sleep disturbance. The patient is nervous/anxious.        Objective:      Physical Exam  Vitals reviewed.   Constitutional:       General: She is not in acute distress.     Appearance: Normal appearance. She is well-developed and  normal weight.   HENT:      Head: Normocephalic and atraumatic.      Right Ear: Tympanic membrane, ear canal and external ear normal.      Left Ear: Tympanic membrane, ear canal and external ear normal.   Eyes:      General: Lids are normal. No scleral icterus.     Extraocular Movements: Extraocular movements intact.      Conjunctiva/sclera: Conjunctivae normal.      Pupils: Pupils are equal, round, and reactive to light.      Comments: Left eye orbital hematoma- healing, no nystagmus bilaterally   Cardiovascular:      Rate and Rhythm: Normal rate and regular rhythm.      Pulses: Normal pulses.      Heart sounds: Normal heart sounds. No murmur heard.     No friction rub. No gallop.   Pulmonary:      Effort: Pulmonary effort is normal. No respiratory distress.      Breath sounds: Normal breath sounds. No decreased breath sounds, wheezing, rhonchi or rales.      Comments: No chest pain on palpation  Neurological:      General: No focal deficit present.      Mental Status: She is alert and oriented to person, place, and time. Mental status is at baseline.      Cranial Nerves: No cranial nerve deficit.      Gait: Gait normal.   Psychiatric:         Mood and Affect: Mood and affect normal.         Behavior: Behavior normal.         Thought Content: Thought content normal.         Judgment: Judgment normal.      Comments: anxious         Assessment:       1. Syncope, unspecified syncope type    2. Shortness of breath    3. Attention deficit disorder, unspecified type    4. Asthma with acute exacerbation, unspecified asthma severity, unspecified whether persistent    5. Anxiety    6. PTSD (post-traumatic stress disorder)    7. Gastroesophageal reflux disease, unspecified whether esophagitis present        Plan:   Assessment & Plan    Assessed recent syncopal event; ruled out intracranial pathology based on normal ED scans  Considered POTS as potential underlying cause for recurrent dizziness and syncope  Evaluated chest  tightness and shortness of breath; suspected combination of anxiety and possible asthma  Discontinued Vyvanse due to potential exacerbation of palpitations  Increased Zoloft dosage to address ongoing anxiety symptoms  Prescribed hydroxyzine for acute anxiety management  Recommend cardiology evaluation for further workup of syncope and possible POTS      1. Syncope, unspecified syncope type   Idalia to increase water intake to prevent dehydration-related dizziness.   Referred to cardiology for evaluation of syncope and possible POTS. Able to see cardiology today, directly following clinic appt.   -     Ambulatory referral/consult to Cardiology; Future; Expected date: 10/24/2024    2. Shortness of breath  -Likely asthma exacerbation, worsened with anxiety attacks.   -     budesonide-formoterol 160-4.5 mcg (SYMBICORT) 160-4.5 mcg/actuation HFAA; Inhale 2 puffs into the lungs every 12 (twelve) hours. Controller for 14 days  Dispense: 6 g; Refill: 0      3. Attention deficit disorder, unspecified type  Overview:  Stopped Vyvanse    4. Asthma with acute exacerbation, unspecified asthma severity, unspecified whether persistent   Explained difference between wheezing asthma and chest tightness asthma.   Discussed proper inhaler technique, including importance of slow exhalation and rinsing mouth after steroid inhaler use.   Advised on potential side effects of steroid inhaler, such as thrush.   Started Symbicort inhaler: 2 puffs every 12 hours for 2 weeks.   Continued albuterol inhaler: use as needed for shortness of breath.  -     budesonide-formoterol 160-4.5 mcg (SYMBICORT) 160-4.5 mcg/actuation HFAA; Inhale 2 puffs into the lungs every 12 (twelve) hours. Controller for 14 days  Dispense: 6 g; Refill: 0      5. Anxiety  Overview:   Started hydroxyzine: take as needed for acute anxiety or chest tightness.   Educated on the time frame for antidepressants to take effect.   Increased Zoloft from current dose to 100 mg daily  (2 tablets)..    Referred to psychiatry for comprehensive mental health evaluation and management.  Orders:  -     Ambulatory referral/consult to Psychiatry; Future; Expected date: 10/24/2024  -     hydrOXYzine HCL (ATARAX) 25 MG tablet; Take 1 tablet (25 mg total) by mouth 3 (three) times daily as needed for Anxiety.  Dispense: 45 tablet; Refill: 1    6. PTSD (post-traumatic stress disorder)  Overview:   Increased Zoloft from current dose to 100 mg daily (2 tablets).    Referred to psychiatry for comprehensive mental health evaluation and management.    Orders:  -     sertraline (ZOLOFT) 50 MG tablet; Take 2 tablets (100 mg total) by mouth once daily.  Dispense: 30 tablet; Refill: 10    7. Gastroesophageal reflux disease, unspecified whether esophagitis present   Started famotidine: take as needed for indigestion; notify if daily use becomes necessary.   Contact office if famotidine is needed daily.  -     famotidine (PEPCID) 40 MG tablet; Take 1 tablet (40 mg total) by mouth 2 (two) times daily as needed for Heartburn (indegestion).  Dispense: 30 tablet; Refill: 2        FOLLOW UP:   Follow up in 6 months or annually for regular primary care appointment.   Contact via [employer] if any issues arise before next appointment.           This note was generated with the assistance of ambient listening technology. I attest to having reviewed and edited the generated note for accuracy, though some syntax or spelling errors may persist. Please contact the author of this note for any clarification.    Visit today included increased complexity associated with the care of the episodic problem anxiety and asthma exacerbations which was addressed while instituting co-management of the longitudinal care of the patient due to the serious and/or complex managed problem(s) .    I have evaluated and discussed management associated with medical care services that serve as the continuing focal point for all needed health care services  and/or with medical care services that are part of ongoing care related to my patient's single, serious condition or a complex condition(s).    I am providing ongoing care and I am the primary care provider for this patient, and they are being managed, monitored, and/or observed for their chronic conditions over time.     I have addressed their ongoing health maintenance requirements and needs for all health care services and reviewed co-management plans provided by specialty providers when available.    Health Maintenance Due   Topic Date Due    Pneumococcal Vaccines (Age 0-64) (1 of 2 - PCV) Never done    Influenza Vaccine (1) 09/01/2024    COVID-19 Vaccine (3 - 2024-25 season) 09/01/2024

## 2024-10-17 NOTE — PATIENT INSTRUCTIONS
We are increasing your Zoloft to 100mg (2 tablets), taken once per day.     New Medication: Hydroxyzine, you can take 1 tablet, IF NEEDED, every 8 hours, for anxiety.     Stop taking Vyvanse.     Use your albuterol inhaler for shortness of breath, and start new inhaler Symbicort, 2 puffs, every twelve hours, for 2 weeks.

## 2024-10-18 ENCOUNTER — PATIENT MESSAGE (OUTPATIENT)
Dept: PSYCHIATRY | Facility: CLINIC | Age: 34
End: 2024-10-18
Payer: OTHER GOVERNMENT

## 2024-10-22 ENCOUNTER — PROCEDURE VISIT (OUTPATIENT)
Dept: ALLERGY | Facility: CLINIC | Age: 34
End: 2024-10-22
Payer: OTHER GOVERNMENT

## 2024-10-22 ENCOUNTER — OFFICE VISIT (OUTPATIENT)
Dept: ALLERGY | Facility: CLINIC | Age: 34
End: 2024-10-22
Payer: OTHER GOVERNMENT

## 2024-10-22 ENCOUNTER — LAB VISIT (OUTPATIENT)
Dept: LAB | Facility: HOSPITAL | Age: 34
End: 2024-10-22
Attending: STUDENT IN AN ORGANIZED HEALTH CARE EDUCATION/TRAINING PROGRAM
Payer: OTHER GOVERNMENT

## 2024-10-22 VITALS
WEIGHT: 157.44 LBS | TEMPERATURE: 98 F | DIASTOLIC BLOOD PRESSURE: 66 MMHG | SYSTOLIC BLOOD PRESSURE: 106 MMHG | HEART RATE: 65 BPM | BODY MASS INDEX: 23.93 KG/M2

## 2024-10-22 DIAGNOSIS — G90.A POTS (POSTURAL ORTHOSTATIC TACHYCARDIA SYNDROME): ICD-10-CM

## 2024-10-22 DIAGNOSIS — Z91.038 HYMENOPTERA ALLERGY: Primary | ICD-10-CM

## 2024-10-22 DIAGNOSIS — J30.89 ALLERGIC RHINITIS DUE TO DUST MITE: ICD-10-CM

## 2024-10-22 DIAGNOSIS — J30.81 ALLERGIC RHINITIS DUE TO ANIMAL DANDER: ICD-10-CM

## 2024-10-22 DIAGNOSIS — J45.20 MILD INTERMITTENT ASTHMA WITHOUT COMPLICATION: ICD-10-CM

## 2024-10-22 DIAGNOSIS — Z91.038 HYMENOPTERA ALLERGY: ICD-10-CM

## 2024-10-22 DIAGNOSIS — J30.1 SEASONAL ALLERGIC RHINITIS DUE TO POLLEN: Primary | ICD-10-CM

## 2024-10-22 PROCEDURE — 99999 PR PBB SHADOW E&M-EST. PATIENT-LVL IV: CPT | Mod: PBBFAC,,, | Performed by: STUDENT IN AN ORGANIZED HEALTH CARE EDUCATION/TRAINING PROGRAM

## 2024-10-22 PROCEDURE — 99214 OFFICE O/P EST MOD 30 MIN: CPT | Mod: S$PBB,,, | Performed by: STUDENT IN AN ORGANIZED HEALTH CARE EDUCATION/TRAINING PROGRAM

## 2024-10-22 PROCEDURE — 95146 ANTIGEN THERAPY SERVICES: CPT | Mod: S$PBB,,, | Performed by: STUDENT IN AN ORGANIZED HEALTH CARE EDUCATION/TRAINING PROGRAM

## 2024-10-22 PROCEDURE — 83520 IMMUNOASSAY QUANT NOS NONAB: CPT | Performed by: STUDENT IN AN ORGANIZED HEALTH CARE EDUCATION/TRAINING PROGRAM

## 2024-10-22 PROCEDURE — 95146 ANTIGEN THERAPY SERVICES: CPT | Mod: PBBFAC

## 2024-10-22 PROCEDURE — 36415 COLL VENOUS BLD VENIPUNCTURE: CPT | Performed by: STUDENT IN AN ORGANIZED HEALTH CARE EDUCATION/TRAINING PROGRAM

## 2024-10-22 PROCEDURE — 95117 IMMUNOTHERAPY INJECTIONS: CPT | Mod: S$PBB,,, | Performed by: STUDENT IN AN ORGANIZED HEALTH CARE EDUCATION/TRAINING PROGRAM

## 2024-10-22 PROCEDURE — 99214 OFFICE O/P EST MOD 30 MIN: CPT | Mod: PBBFAC | Performed by: STUDENT IN AN ORGANIZED HEALTH CARE EDUCATION/TRAINING PROGRAM

## 2024-10-22 PROCEDURE — 95117 IMMUNOTHERAPY INJECTIONS: CPT | Mod: PBBFAC

## 2024-10-23 PROBLEM — J30.89 ALLERGIC RHINITIS DUE TO DUST MITE: Status: ACTIVE | Noted: 2024-10-23

## 2024-10-23 PROBLEM — G90.A POTS (POSTURAL ORTHOSTATIC TACHYCARDIA SYNDROME): Status: ACTIVE | Noted: 2024-10-23

## 2024-10-23 PROBLEM — J30.1 SEASONAL ALLERGIC RHINITIS DUE TO POLLEN: Status: ACTIVE | Noted: 2024-10-23

## 2024-10-23 PROBLEM — J31.0 CHRONIC RHINITIS: Status: RESOLVED | Noted: 2024-09-18 | Resolved: 2024-10-23

## 2024-10-23 PROBLEM — J30.81 ALLERGIC RHINITIS DUE TO ANIMAL DANDER: Status: ACTIVE | Noted: 2024-10-23

## 2024-10-23 NOTE — PROCEDURES
Allergy and Immunology  Procedure Note     - Presented with epinephrine autoinjector  - Administered by Roxann Joel  - 30 minute observation prior to clinic discharge    Kesler Bourgoyne, MD [facility]  Allergy and Clinical Immunology

## 2024-10-24 LAB — TRYPTASE LEVEL: 3.2 NG/ML

## 2024-10-24 NOTE — ASSESSMENT & PLAN NOTE
- No acute complaints   - Continue current medications   - Educated on environmental controls   - Will continue to monitor and reassess

## 2024-10-24 NOTE — PROGRESS NOTES
Allergy and Immunology  Established Patient Clinic Note    Date: 10/23/2024  Chief Complaint   Patient presents with    Follow-up     History  Idalia Hooper is a 33 y.o. female being seen for follow-up today.    Allergic Rhinitis due to cat, dog, dust mites, and grass/weed pollen  - No acute complaints   - Continue current medications    Hymenoptera Allergy   Large Local Reaction   - On VIT, tolerating without reaction   - No hx of anaphylaxis    Syncope and Collapse  POTS?  - Ordered Tryptase at this time     Allergies, PMH, PSH, Social, and Family History were reviewed.    Current Outpatient Medications on File Prior to Visit   Medication Sig Dispense Refill    albuterol (PROVENTIL/VENTOLIN HFA) 90 mcg/actuation inhaler Inhale 2 puffs into the lungs every 4 (four) hours as needed for Wheezing. 18 g 2    budesonide-formoterol 160-4.5 mcg (SYMBICORT) 160-4.5 mcg/actuation HFAA Inhale 2 puffs into the lungs every 12 (twelve) hours. Controller for 14 days 6 g 0    famotidine (PEPCID) 40 MG tablet Take 1 tablet (40 mg total) by mouth 2 (two) times daily as needed for Heartburn (indegestion). 30 tablet 2    hydrOXYzine HCL (ATARAX) 25 MG tablet Take 1 tablet (25 mg total) by mouth 3 (three) times daily as needed for Anxiety. 45 tablet 1    midodrine (PROAMATINE) 2.5 MG Tab Take 1 tablet (2.5 mg total) by mouth 3 (three) times daily as needed (for BP below 110/70). 360 tablet 1    montelukast (SINGULAIR) 10 mg tablet Take 1 tablet (10 mg total) by mouth once daily. 90 tablet 3    sertraline (ZOLOFT) 50 MG tablet Take 2 tablets (100 mg total) by mouth once daily. 30 tablet 10    sodium chloride 1,000 mg TbSO oral tablet Take 1 tablet (1,000 mg total) by mouth 3 (three) times daily. 90 tablet 1    triamcinolone acetonide 0.1% (KENALOG) 0.1 % ointment Apply topically 2 (two) times daily. 80 g 11    valACYclovir (VALTREX) 500 MG tablet Take 1 tablet (500 mg total) by mouth 2 (two) times daily. 30 tablet 4     cyclobenzaprine (FLEXERIL) 5 MG tablet Take 10 mg by mouth nightly. (Patient not taking: Reported on 10/22/2024)      EPINEPHrine (EPIPEN 2-MARTITA) 0.3 mg/0.3 mL AtIn Inject 0.3 mLs (0.3 mg total) into the muscle once. for 1 dose (Patient not taking: Reported on 10/17/2024) 0.3 mL 0    metroNIDAZOLE (FLAGYL) 500 MG tablet Take 500 mg by mouth. (Patient not taking: Reported on 10/22/2024)       No current facility-administered medications on file prior to visit.     Physical Examination  Vitals:    10/22/24 1558   BP: 106/66   Pulse: 65   Temp: 98 °F (36.7 °C)     GENERAL:  female in no apparent distress and well developed and well nourished  HEAD:  Normocephalic, without obvious abnormality, atraumatic  EYES: sclera anicteric, conjunctiva normochromic  EARS: normal TM's and external ear canals both ears  NOSE: without erythema or discharge, clear discharge, turbinates normal    OROPHARYNX: moist mucous membranes without erythema, exudates or petechiae  LYMPH NODES: normal, supple, no lymphadenopathy  LUNGS: clear to auscultation, no wheezes, rales or rhonchi, symmetric air entry.  HEART: normal rate, regular rhythm, normal S1, S2, no murmurs, rubs, clicks or gallops.  ABDOMEN: soft, nontender, nondistended, no masses or organomegaly.  MUSCULOSKELETAL: no gross joint deformity or swelling.  NEURO: alert, oriented, normal speech, no focal findings or movement disorder noted.  SKIN: normal coloration and turgor, no rashes, no suspicious skin lesions noted.     Assessment/Plan:   Problem List Items Addressed This Visit       Mild intermittent asthma without complication    Overview     Stable on albuterol p.r.n.         Hymenoptera allergy    Overview     - Wasp: large local reactions last for at least 1 week   - Patient with no hx of anaphylaxis  - Avid  and frequent yard work with frequent exposure   - Patient with multiple stings annually - considering AIT for large local         Seasonal allergic rhinitis due  to pollen - Primary    Overview     - 09/17/2024: Serum IgE positive to cat, dog, dust mites, and grass/weed pollen         Current Assessment & Plan     - No acute complaints   - Continue current medications   - Educated on environmental controls   - Will continue to monitor and reassess         Allergic rhinitis due to dust mite    Overview     - 09/17/2024: Serum IgE positive to cat, dog, dust mites, and grass/weed pollen         Allergic rhinitis due to animal dander    Overview     - 09/17/2024: Serum IgE positive to cat, dog, dust mites, and grass/weed pollen         POTS (postural orthostatic tachycardia syndrome)    Current Assessment & Plan     - Ordered tryptase to assess for mast cell disorder         Relevant Orders    Tryptase     Follow up:  Follow up in about 3 months (around 1/22/2025).    Marivel Parsons MD   [facility]  Allergy and Immunology

## 2024-10-30 ENCOUNTER — HOSPITAL ENCOUNTER (OUTPATIENT)
Dept: CARDIOLOGY | Facility: HOSPITAL | Age: 34
Discharge: HOME | End: 2024-10-30
Attending: INTERNAL MEDICINE
Payer: OTHER GOVERNMENT

## 2024-10-30 VITALS
WEIGHT: 157 LBS | BODY MASS INDEX: 23.79 KG/M2 | HEIGHT: 68 IN | SYSTOLIC BLOOD PRESSURE: 106 MMHG | DIASTOLIC BLOOD PRESSURE: 66 MMHG

## 2024-10-30 VITALS
HEART RATE: 66 BPM | BODY MASS INDEX: 23.79 KG/M2 | SYSTOLIC BLOOD PRESSURE: 105 MMHG | HEIGHT: 68 IN | WEIGHT: 157 LBS | DIASTOLIC BLOOD PRESSURE: 69 MMHG

## 2024-10-30 DIAGNOSIS — F98.8 ATTENTION DEFICIT DISORDER, UNSPECIFIED TYPE: ICD-10-CM

## 2024-10-30 DIAGNOSIS — R55 SYNCOPE, UNSPECIFIED SYNCOPE TYPE: ICD-10-CM

## 2024-10-30 DIAGNOSIS — F41.9 ANXIETY: ICD-10-CM

## 2024-10-30 DIAGNOSIS — R00.2 PALPITATIONS: ICD-10-CM

## 2024-10-30 DIAGNOSIS — R06.09 OTHER FORM OF DYSPNEA: ICD-10-CM

## 2024-10-30 DIAGNOSIS — J45.20 MILD INTERMITTENT ASTHMA WITHOUT COMPLICATION: ICD-10-CM

## 2024-10-30 DIAGNOSIS — F43.10 PTSD (POST-TRAUMATIC STRESS DISORDER): ICD-10-CM

## 2024-10-30 LAB
AORTIC ROOT ANNULUS: 2.3 CM
ASCENDING AORTA: 3.07 CM
AV INDEX (PROSTH): 0.84
AV MEAN GRADIENT: 3.3 MMHG
AV PEAK GRADIENT: 5.8 MMHG
AV VALVE AREA BY VELOCITY RATIO: 2.1 CM²
AV VALVE AREA: 2.4 CM²
AV VELOCITY RATIO: 0.75
BSA FOR ECHO PROCEDURE: 1.85 M2
CV ECHO LV RWT: 0.35 CM
CV STRESS BASE HR: 66 BPM
DIASTOLIC BLOOD PRESSURE: 69 MMHG
DOP CALC AO PEAK VEL: 1.2 M/S
DOP CALC AO VTI: 25.8 CM
DOP CALC LVOT AREA: 2.8 CM2
DOP CALC LVOT DIAMETER: 1.9 CM
DOP CALC LVOT PEAK VEL: 0.9 M/S
DOP CALC LVOT STROKE VOLUME: 61.5 CM3
DOP CALC RVOT PEAK VEL: 0.66 M/S
DOP CALC RVOT VTI: 18.2 CM
DOP CALCLVOT PEAK VEL VTI: 21.7 CM
E WAVE DECELERATION TIME: 290.83 MSEC
E/A RATIO: 2.26
E/E' RATIO: 6.11 M/S
ECHO LV POSTERIOR WALL: 0.8 CM (ref 0.6–1.1)
FRACTIONAL SHORTENING: 30.4 % (ref 28–44)
INTERVENTRICULAR SEPTUM: 0.8 CM (ref 0.6–1.1)
IVC DIAMETER: 1.82 CM
IVRT: 95.15 MSEC
LA MAJOR: 5.37 CM
LA MINOR: 4.51 CM
LA WIDTH: 3.1 CM
LEFT ATRIUM AREA SYSTOLIC (APICAL 2 CHAMBER): 13.52 CM2
LEFT ATRIUM AREA SYSTOLIC (APICAL 4 CHAMBER): 11.27 CM2
LEFT ATRIUM SIZE: 2.86 CM
LEFT ATRIUM VOLUME INDEX MOD: 14.6 ML/M2
LEFT ATRIUM VOLUME INDEX: 20.1 ML/M2
LEFT ATRIUM VOLUME MOD: 26.84 ML
LEFT ATRIUM VOLUME: 36.95 CM3
LEFT INTERNAL DIMENSION IN SYSTOLE: 3.2 CM (ref 2.1–4)
LEFT VENTRICLE DIASTOLIC VOLUME INDEX: 54.09 ML/M2
LEFT VENTRICLE DIASTOLIC VOLUME: 99.52 ML
LEFT VENTRICLE END SYSTOLIC VOLUME APICAL 2 CHAMBER: 32.94 ML
LEFT VENTRICLE END SYSTOLIC VOLUME APICAL 4 CHAMBER: 18.98 ML
LEFT VENTRICLE MASS INDEX: 64.1 G/M2
LEFT VENTRICLE SYSTOLIC VOLUME INDEX: 23 ML/M2
LEFT VENTRICLE SYSTOLIC VOLUME: 42.26 ML
LEFT VENTRICULAR INTERNAL DIMENSION IN DIASTOLE: 4.6 CM (ref 3.5–6)
LEFT VENTRICULAR MASS: 117.9 G
LV LATERAL E/E' RATIO: 5.65 M/S
LV SEPTAL E/E' RATIO: 6.65 M/S
LVED V (TEICH): 99.52 ML
LVES V (TEICH): 42.26 ML
LVOT MG: 2.16 MMHG
LVOT MV: 0.7 CM/S
MV PEAK A VEL: 0.5 M/S
MV PEAK E VEL: 1.13 M/S
MV STENOSIS PRESSURE HALF TIME: 84.34 MS
MV VALVE AREA P 1/2 METHOD: 2.61 CM2
OHS CV CPX 1 MINUTE RECOVERY HEART RATE: 118 BPM
OHS CV CPX 85 PERCENT MAX PREDICTED HEART RATE MALE: 161
OHS CV CPX ESTIMATED METS: 11
OHS CV CPX MAX PREDICTED HEART RATE: 189
OHS CV CPX PATIENT IS FEMALE: 1
OHS CV CPX PATIENT IS MALE: 0
OHS CV CPX PEAK DIASTOLIC BLOOD PRESSURE: 56 MMHG
OHS CV CPX PEAK HEAR RATE: 139 BPM
OHS CV CPX PEAK RATE PRESSURE PRODUCT: NORMAL
OHS CV CPX PEAK SYSTOLIC BLOOD PRESSURE: 139 MMHG
OHS CV CPX PERCENT MAX PREDICTED HEART RATE ACHIEVED: 78
OHS CV CPX RATE PRESSURE PRODUCT PRESENTING: 6930
PISA TR MAX VEL: 2.5 M/S
PULM VEIN S/D RATIO: 0.58
PV MEAN GRADIENT: 1 MMHG
PV MV: 0.61 M/S
PV PEAK D VEL: 0.5 M/S
PV PEAK GRADIENT: 3 MMHG
PV PEAK S VEL: 0.29 M/S
PV PEAK VELOCITY: 0.81 M/S
RA MAJOR: 4.86 CM
RA PRESSURE ESTIMATED: 3 MMHG
RA WIDTH: 3.09 CM
RIGHT VENTRICULAR END-DIASTOLIC DIMENSION: 3.51 CM
RV TB RVSP: 6 MMHG
SINUS: 2.71 CM
STJ: 2.69 CM
STRESS ECHO POST EXERCISE DUR MIN: 9 MINUTES
STRESS ECHO POST EXERCISE DUR SEC: 23 SECONDS
SYSTOLIC BLOOD PRESSURE: 105 MMHG
TDI LATERAL: 0.2 M/S
TDI SEPTAL: 0.17 M/S
TDI: 0.19 M/S
TR MAX PG: 25 MMHG
TRICUSPID ANNULAR PLANE SYSTOLIC EXCURSION: 2.08 CM
TV REST PULMONARY ARTERY PRESSURE: 28 MMHG
Z-SCORE OF LEFT VENTRICULAR DIMENSION IN END DIASTOLE: -1.04
Z-SCORE OF LEFT VENTRICULAR DIMENSION IN END SYSTOLE: 0.12

## 2024-10-30 PROCEDURE — 93018 CV STRESS TEST I&R ONLY: CPT | Mod: ,,, | Performed by: INTERNAL MEDICINE

## 2024-10-30 PROCEDURE — 93306 TTE W/DOPPLER COMPLETE: CPT

## 2024-10-30 PROCEDURE — 93017 CV STRESS TEST TRACING ONLY: CPT

## 2024-10-30 PROCEDURE — 93016 CV STRESS TEST SUPVJ ONLY: CPT | Mod: ,,, | Performed by: INTERNAL MEDICINE

## 2024-10-30 PROCEDURE — 93306 TTE W/DOPPLER COMPLETE: CPT | Mod: 26,,, | Performed by: INTERNAL MEDICINE

## 2024-10-31 ENCOUNTER — CLINICAL SUPPORT (OUTPATIENT)
Dept: ALLERGY | Facility: CLINIC | Age: 34
End: 2024-10-31
Payer: OTHER GOVERNMENT

## 2024-10-31 DIAGNOSIS — Z91.038 HYMENOPTERA ALLERGY: Primary | ICD-10-CM

## 2024-10-31 PROCEDURE — 99999 PR PBB SHADOW E&M-EST. PATIENT-LVL I: CPT | Mod: PBBFAC,,,

## 2024-11-07 ENCOUNTER — CLINICAL SUPPORT (OUTPATIENT)
Dept: ALLERGY | Facility: CLINIC | Age: 34
End: 2024-11-07
Payer: OTHER GOVERNMENT

## 2024-11-07 DIAGNOSIS — Z91.038 HYMENOPTERA ALLERGY: Primary | ICD-10-CM

## 2024-11-07 PROCEDURE — 99999 PR PBB SHADOW E&M-EST. PATIENT-LVL III: CPT | Mod: PBBFAC,,,

## 2024-11-07 PROCEDURE — 95146 ANTIGEN THERAPY SERVICES: CPT | Mod: PBBFAC

## 2024-11-07 PROCEDURE — 95117 IMMUNOTHERAPY INJECTIONS: CPT | Mod: PBBFAC

## 2024-11-14 ENCOUNTER — CLINICAL SUPPORT (OUTPATIENT)
Dept: ALLERGY | Facility: CLINIC | Age: 34
End: 2024-11-14
Payer: OTHER GOVERNMENT

## 2024-11-14 DIAGNOSIS — Z91.038 HYMENOPTERA ALLERGY: Primary | ICD-10-CM

## 2024-11-14 PROCEDURE — 95146 ANTIGEN THERAPY SERVICES: CPT | Mod: PBBFAC

## 2024-11-14 PROCEDURE — 99999 PR PBB SHADOW E&M-EST. PATIENT-LVL III: CPT | Mod: PBBFAC,,,

## 2024-11-14 PROCEDURE — 95117 IMMUNOTHERAPY INJECTIONS: CPT | Mod: PBBFAC

## 2024-11-14 NOTE — PROGRESS NOTES
See Flowsheet for VENOM Immunotherapy administration.  Patient waited 30 mins in clinic for observation.  Patient had Epi Pen on hand.

## 2024-11-21 ENCOUNTER — CLINICAL SUPPORT (OUTPATIENT)
Dept: ALLERGY | Facility: CLINIC | Age: 34
End: 2024-11-21
Payer: OTHER GOVERNMENT

## 2024-11-21 ENCOUNTER — OFFICE VISIT (OUTPATIENT)
Dept: CARDIOLOGY | Facility: CLINIC | Age: 34
End: 2024-11-21
Payer: OTHER GOVERNMENT

## 2024-11-21 VITALS
HEIGHT: 68 IN | OXYGEN SATURATION: 98 % | WEIGHT: 155.19 LBS | DIASTOLIC BLOOD PRESSURE: 71 MMHG | RESPIRATION RATE: 16 BRPM | SYSTOLIC BLOOD PRESSURE: 113 MMHG | BODY MASS INDEX: 23.52 KG/M2 | HEART RATE: 90 BPM

## 2024-11-21 DIAGNOSIS — R06.09 OTHER FORM OF DYSPNEA: ICD-10-CM

## 2024-11-21 DIAGNOSIS — F43.10 PTSD (POST-TRAUMATIC STRESS DISORDER): ICD-10-CM

## 2024-11-21 DIAGNOSIS — R00.2 PALPITATIONS: ICD-10-CM

## 2024-11-21 DIAGNOSIS — Z91.038 HYMENOPTERA ALLERGY: Primary | ICD-10-CM

## 2024-11-21 DIAGNOSIS — J45.20 MILD INTERMITTENT ASTHMA WITHOUT COMPLICATION: ICD-10-CM

## 2024-11-21 DIAGNOSIS — F41.9 ANXIETY: ICD-10-CM

## 2024-11-21 DIAGNOSIS — R55 SYNCOPE, UNSPECIFIED SYNCOPE TYPE: Primary | ICD-10-CM

## 2024-11-21 DIAGNOSIS — F98.8 ATTENTION DEFICIT DISORDER, UNSPECIFIED TYPE: ICD-10-CM

## 2024-11-21 DIAGNOSIS — Z86.16 HISTORY OF COVID-19: ICD-10-CM

## 2024-11-21 PROCEDURE — 99214 OFFICE O/P EST MOD 30 MIN: CPT | Mod: PBBFAC | Performed by: INTERNAL MEDICINE

## 2024-11-21 PROCEDURE — G2211 COMPLEX E/M VISIT ADD ON: HCPCS | Mod: S$PBB,,, | Performed by: INTERNAL MEDICINE

## 2024-11-21 PROCEDURE — 99999 PR PBB SHADOW E&M-EST. PATIENT-LVL II: CPT | Mod: PBBFAC,,,

## 2024-11-21 PROCEDURE — 99999 PR PBB SHADOW E&M-EST. PATIENT-LVL IV: CPT | Mod: PBBFAC,,, | Performed by: INTERNAL MEDICINE

## 2024-11-21 PROCEDURE — 99214 OFFICE O/P EST MOD 30 MIN: CPT | Mod: S$PBB,,, | Performed by: INTERNAL MEDICINE

## 2024-11-21 RX ORDER — SODIUM CHLORIDE 1 G/1
1000 TABLET ORAL 3 TIMES DAILY
Qty: 180 TABLET | Refills: 1 | Status: SHIPPED | OUTPATIENT
Start: 2024-11-21
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
## 2024-11-26 ENCOUNTER — CLINICAL SUPPORT (OUTPATIENT)
Dept: ALLERGY | Facility: CLINIC | Age: 34
End: 2024-11-26
Payer: OTHER GOVERNMENT

## 2024-11-26 DIAGNOSIS — Z91.038 HYMENOPTERA ALLERGY: Primary | ICD-10-CM

## 2024-11-26 PROCEDURE — 95146 ANTIGEN THERAPY SERVICES: CPT | Mod: S$PBB,,, | Performed by: STUDENT IN AN ORGANIZED HEALTH CARE EDUCATION/TRAINING PROGRAM

## 2024-11-26 PROCEDURE — 95117 IMMUNOTHERAPY INJECTIONS: CPT | Mod: S$PBB,,, | Performed by: STUDENT IN AN ORGANIZED HEALTH CARE EDUCATION/TRAINING PROGRAM

## 2024-11-26 PROCEDURE — 95117 IMMUNOTHERAPY INJECTIONS: CPT | Mod: PBBFAC

## 2024-11-26 PROCEDURE — 99999 PR PBB SHADOW E&M-EST. PATIENT-LVL III: CPT | Mod: PBBFAC,,,

## 2024-11-26 PROCEDURE — 95146 ANTIGEN THERAPY SERVICES: CPT | Mod: PBBFAC

## 2024-11-26 NOTE — PROGRESS NOTES
See Flowsheet for VENOM Immunotherapy administration.  Patient waited 30 mins in clinic for observation.  Patient had Epi Pen on hand.  No reaction noted.

## 2024-12-03 ENCOUNTER — CLINICAL SUPPORT (OUTPATIENT)
Dept: ALLERGY | Facility: CLINIC | Age: 34
End: 2024-12-03
Payer: OTHER GOVERNMENT

## 2024-12-03 DIAGNOSIS — Z91.038 HYMENOPTERA ALLERGY: Primary | ICD-10-CM

## 2024-12-03 PROCEDURE — 99999 PR PBB SHADOW E&M-EST. PATIENT-LVL III: CPT | Mod: PBBFAC,,,

## 2024-12-03 PROCEDURE — 95117 IMMUNOTHERAPY INJECTIONS: CPT | Mod: S$PBB,,, | Performed by: STUDENT IN AN ORGANIZED HEALTH CARE EDUCATION/TRAINING PROGRAM

## 2024-12-03 PROCEDURE — 95117 IMMUNOTHERAPY INJECTIONS: CPT | Mod: PBBFAC

## 2024-12-03 PROCEDURE — 95146 ANTIGEN THERAPY SERVICES: CPT | Mod: PBBFAC

## 2024-12-03 PROCEDURE — 95146 ANTIGEN THERAPY SERVICES: CPT | Mod: S$PBB,,, | Performed by: STUDENT IN AN ORGANIZED HEALTH CARE EDUCATION/TRAINING PROGRAM

## 2024-12-10 ENCOUNTER — CLINICAL SUPPORT (OUTPATIENT)
Dept: ALLERGY | Facility: CLINIC | Age: 34
End: 2024-12-10
Payer: OTHER GOVERNMENT

## 2024-12-10 DIAGNOSIS — Z91.038 HYMENOPTERA ALLERGY: Primary | ICD-10-CM

## 2024-12-10 PROCEDURE — 99999 PR PBB SHADOW E&M-EST. PATIENT-LVL II: CPT | Mod: PBBFAC,,,

## 2024-12-10 PROCEDURE — 95146 ANTIGEN THERAPY SERVICES: CPT | Mod: S$PBB,,, | Performed by: STUDENT IN AN ORGANIZED HEALTH CARE EDUCATION/TRAINING PROGRAM

## 2024-12-10 PROCEDURE — 95117 IMMUNOTHERAPY INJECTIONS: CPT | Mod: S$PBB,,, | Performed by: STUDENT IN AN ORGANIZED HEALTH CARE EDUCATION/TRAINING PROGRAM

## 2024-12-10 PROCEDURE — 95117 IMMUNOTHERAPY INJECTIONS: CPT | Mod: PBBFAC

## 2024-12-10 PROCEDURE — 95146 ANTIGEN THERAPY SERVICES: CPT | Mod: PBBFAC

## 2024-12-17 ENCOUNTER — CLINICAL SUPPORT (OUTPATIENT)
Dept: ALLERGY | Facility: CLINIC | Age: 34
End: 2024-12-17
Payer: OTHER GOVERNMENT

## 2024-12-17 DIAGNOSIS — Z91.038 HYMENOPTERA ALLERGY: Primary | ICD-10-CM

## 2024-12-17 PROCEDURE — 95117 IMMUNOTHERAPY INJECTIONS: CPT | Mod: S$PBB,,, | Performed by: STUDENT IN AN ORGANIZED HEALTH CARE EDUCATION/TRAINING PROGRAM

## 2024-12-17 PROCEDURE — 95146 ANTIGEN THERAPY SERVICES: CPT | Mod: PBBFAC

## 2024-12-17 PROCEDURE — 95146 ANTIGEN THERAPY SERVICES: CPT | Mod: S$PBB,,, | Performed by: STUDENT IN AN ORGANIZED HEALTH CARE EDUCATION/TRAINING PROGRAM

## 2024-12-17 PROCEDURE — 99999 PR PBB SHADOW E&M-EST. PATIENT-LVL III: CPT | Mod: PBBFAC,,,

## 2024-12-17 PROCEDURE — 95117 IMMUNOTHERAPY INJECTIONS: CPT | Mod: PBBFAC

## 2024-12-26 ENCOUNTER — TELEPHONE (OUTPATIENT)
Dept: ALLERGY | Facility: CLINIC | Age: 34
End: 2024-12-26
Payer: OTHER GOVERNMENT

## 2024-12-26 NOTE — TELEPHONE ENCOUNTER
Coming tomorrow for shot.    ----- Message from Zoe sent at 12/26/2024 12:41 PM CST -----  Contact: [phone]  [email or web address]     Patient is calling for Medical Advice regarding:Patient missed her appointment today. Patient would like a call to reschedule visit     How long has patient had these symptoms:    Pharmacy name and phone#:    Patient wants a call back or thru [facility]:call     Comments:    Please advise patient replies from provider may take up to 48 hours.

## 2024-12-27 ENCOUNTER — CLINICAL SUPPORT (OUTPATIENT)
Dept: ALLERGY | Facility: CLINIC | Age: 34
End: 2024-12-27
Payer: OTHER GOVERNMENT

## 2024-12-27 DIAGNOSIS — Z91.038 HYMENOPTERA ALLERGY: Primary | ICD-10-CM

## 2024-12-27 PROCEDURE — 99999 PR PBB SHADOW E&M-EST. PATIENT-LVL II: CPT | Mod: PBBFAC,,,

## 2025-01-03 ENCOUNTER — CLINICAL SUPPORT (OUTPATIENT)
Dept: ALLERGY | Facility: CLINIC | Age: 35
End: 2025-01-03
Payer: OTHER GOVERNMENT

## 2025-01-03 DIAGNOSIS — Z91.038 HYMENOPTERA ALLERGY: Primary | ICD-10-CM

## 2025-01-03 PROCEDURE — 99999 PR PBB SHADOW E&M-EST. PATIENT-LVL II: CPT | Mod: PBBFAC,,,

## 2025-01-03 RX ORDER — CETIRIZINE HYDROCHLORIDE 10 MG/1
20 TABLET ORAL
Status: COMPLETED | OUTPATIENT
Start: 2025-01-03 | End: 2025-01-03

## 2025-01-03 RX ADMIN — CETIRIZINE HYDROCHLORIDE 20 MG: 10 TABLET ORAL at 09:01

## 2025-01-10 ENCOUNTER — CLINICAL SUPPORT (OUTPATIENT)
Dept: ALLERGY | Facility: CLINIC | Age: 35
End: 2025-01-10
Payer: OTHER GOVERNMENT

## 2025-01-10 DIAGNOSIS — Z91.038 HYMENOPTERA ALLERGY: Primary | ICD-10-CM

## 2025-01-10 PROCEDURE — 99999 PR PBB SHADOW E&M-EST. PATIENT-LVL III: CPT | Mod: PBBFAC,,,

## 2025-01-17 ENCOUNTER — CLINICAL SUPPORT (OUTPATIENT)
Dept: ALLERGY | Facility: CLINIC | Age: 35
End: 2025-01-17
Payer: OTHER GOVERNMENT

## 2025-01-17 DIAGNOSIS — Z91.038 HYMENOPTERA ALLERGY: Primary | ICD-10-CM

## 2025-01-17 PROCEDURE — 95146 ANTIGEN THERAPY SERVICES: CPT | Mod: S$PBB,,, | Performed by: STUDENT IN AN ORGANIZED HEALTH CARE EDUCATION/TRAINING PROGRAM

## 2025-01-17 PROCEDURE — 95117 IMMUNOTHERAPY INJECTIONS: CPT | Mod: PBBFAC

## 2025-01-17 PROCEDURE — 95146 ANTIGEN THERAPY SERVICES: CPT | Mod: PBBFAC

## 2025-01-17 PROCEDURE — 99999 PR PBB SHADOW E&M-EST. PATIENT-LVL III: CPT | Mod: PBBFAC,,,

## 2025-01-17 PROCEDURE — 95117 IMMUNOTHERAPY INJECTIONS: CPT | Mod: S$PBB,,, | Performed by: STUDENT IN AN ORGANIZED HEALTH CARE EDUCATION/TRAINING PROGRAM

## 2025-01-18 ENCOUNTER — HOSPITAL ENCOUNTER (EMERGENCY)
Facility: HOSPITAL | Age: 35
Discharge: HOME | End: 2025-01-18
Attending: FAMILY MEDICINE
Payer: OTHER GOVERNMENT

## 2025-01-18 VITALS
WEIGHT: 160.19 LBS | SYSTOLIC BLOOD PRESSURE: 129 MMHG | BODY MASS INDEX: 24.36 KG/M2 | RESPIRATION RATE: 20 BRPM | TEMPERATURE: 98 F | DIASTOLIC BLOOD PRESSURE: 58 MMHG | OXYGEN SATURATION: 97 % | HEART RATE: 80 BPM

## 2025-01-18 DIAGNOSIS — S29.011A MUSCLE STRAIN OF CHEST WALL, INITIAL ENCOUNTER: Primary | ICD-10-CM

## 2025-01-18 DIAGNOSIS — R07.81 RIB PAIN: ICD-10-CM

## 2025-01-18 PROCEDURE — 25000003 PHARM REV CODE 250: Performed by: NURSE PRACTITIONER

## 2025-01-18 PROCEDURE — 96372 THER/PROPH/DIAG INJ SC/IM: CPT | Performed by: NURSE PRACTITIONER

## 2025-01-18 PROCEDURE — 99284 EMERGENCY DEPT VISIT MOD MDM: CPT | Mod: 25

## 2025-01-18 PROCEDURE — 63600175 PHARM REV CODE 636 W HCPCS: Performed by: NURSE PRACTITIONER

## 2025-01-18 RX ORDER — DEXAMETHASONE SODIUM PHOSPHATE 4 MG/ML
8 INJECTION, SOLUTION INTRA-ARTICULAR; INTRALESIONAL; INTRAMUSCULAR; INTRAVENOUS; SOFT TISSUE
Status: COMPLETED | OUTPATIENT
Start: 2025-01-18 | End: 2025-01-18

## 2025-01-18 RX ORDER — HYDROCODONE BITARTRATE AND ACETAMINOPHEN 10; 325 MG/1; MG/1
1 TABLET ORAL
Status: COMPLETED | OUTPATIENT
Start: 2025-01-18 | End: 2025-01-18

## 2025-01-18 RX ORDER — PREDNISONE 50 MG/1
50 TABLET ORAL DAILY
Qty: 4 TABLET | Refills: 0 | Status: SHIPPED | OUTPATIENT
Start: 2025-01-18 | End: 2025-01-22

## 2025-01-18 RX ORDER — BUTALBITAL, ACETAMINOPHEN AND CAFFEINE 50; 325; 40 MG/1; MG/1; MG/1
1 TABLET ORAL EVERY 6 HOURS PRN
Qty: 15 TABLET | Refills: 0 | Status: SHIPPED | OUTPATIENT
Start: 2025-01-18

## 2025-01-18 RX ORDER — CYCLOBENZAPRINE HCL 5 MG
10 TABLET ORAL
Status: DISCONTINUED | OUTPATIENT
Start: 2025-01-18 | End: 2025-01-18

## 2025-01-18 RX ADMIN — HYDROCODONE BITARTRATE AND ACETAMINOPHEN 1 TABLET: 10; 325 TABLET ORAL at 04:01

## 2025-01-18 RX ADMIN — DEXAMETHASONE SODIUM PHOSPHATE 8 MG: 4 INJECTION INTRA-ARTICULAR; INTRALESIONAL; INTRAMUSCULAR; INTRAVENOUS; SOFT TISSUE at 04:01
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
## 2025-01-20 ENCOUNTER — HOSPITAL ENCOUNTER (EMERGENCY)
Facility: HOSPITAL | Age: 35
Discharge: HOME | End: 2025-01-21
Attending: EMERGENCY MEDICINE
Payer: OTHER GOVERNMENT

## 2025-01-20 DIAGNOSIS — R07.9 CHEST PAIN: ICD-10-CM

## 2025-01-20 LAB
ALBUMIN SERPL BCP-MCNC: 4.4 G/DL (ref 3.5–5.2)
ALP SERPL-CCNC: 69 U/L (ref 40–150)
ALT SERPL W/O P-5'-P-CCNC: 20 U/L (ref 10–44)
ANION GAP SERPL CALC-SCNC: 10 MMOL/L (ref 8–16)
AST SERPL-CCNC: 17 U/L (ref 10–40)
B-HCG UR QL: NEGATIVE
BACTERIA #/AREA URNS HPF: ABNORMAL /HPF
BASOPHILS # BLD AUTO: 0.03 K/UL (ref 0–0.2)
BASOPHILS NFR BLD: 0.3 % (ref 0–1.9)
BILIRUB SERPL-MCNC: 0.3 MG/DL (ref 0.1–1)
BILIRUB UR QL STRIP: NEGATIVE
BUN SERPL-MCNC: 12 MG/DL (ref 6–20)
CALCIUM SERPL-MCNC: 9 MG/DL (ref 8.7–10.5)
CHLORIDE SERPL-SCNC: 109 MMOL/L (ref 95–110)
CLARITY UR: CLEAR
CO2 SERPL-SCNC: 22 MMOL/L (ref 23–29)
COLOR UR: YELLOW
CREAT SERPL-MCNC: 0.7 MG/DL (ref 0.5–1.4)
D DIMER PPP IA.FEU-MCNC: <0.19 MG/L FEU
DIFFERENTIAL METHOD BLD: NORMAL
EOSINOPHIL # BLD AUTO: 0 K/UL (ref 0–0.5)
EOSINOPHIL NFR BLD: 0.4 % (ref 0–8)
ERYTHROCYTE [DISTWIDTH] IN BLOOD BY AUTOMATED COUNT: 11.9 % (ref 11.5–14.5)
EST. GFR  (NO RACE VARIABLE): >60 ML/MIN/1.73 M^2
GLUCOSE SERPL-MCNC: 132 MG/DL (ref 70–110)
GLUCOSE UR QL STRIP: NEGATIVE
HCT VFR BLD AUTO: 40.2 % (ref 37–48.5)
HCV AB SERPL QL IA: NEGATIVE
HEP C VIRUS HOLD SPECIMEN: NORMAL
HGB BLD-MCNC: 13 G/DL (ref 12–16)
HGB UR QL STRIP: NEGATIVE
HIV 1+2 AB+HIV1 P24 AG SERPL QL IA: NEGATIVE
IMM GRANULOCYTES # BLD AUTO: 0.02 K/UL (ref 0–0.04)
IMM GRANULOCYTES NFR BLD AUTO: 0.2 % (ref 0–0.5)
KETONES UR QL STRIP: NEGATIVE
LEUKOCYTE ESTERASE UR QL STRIP: ABNORMAL
LYMPHOCYTES # BLD AUTO: 2.1 K/UL (ref 1–4.8)
LYMPHOCYTES NFR BLD: 23.8 % (ref 18–48)
MCH RBC QN AUTO: 30.3 PG (ref 27–31)
MCHC RBC AUTO-ENTMCNC: 32.3 G/DL (ref 32–36)
MCV RBC AUTO: 94 FL (ref 82–98)
MICROSCOPIC COMMENT: ABNORMAL
MONOCYTES # BLD AUTO: 0.6 K/UL (ref 0.3–1)
MONOCYTES NFR BLD: 7 % (ref 4–15)
NEUTROPHILS # BLD AUTO: 6.1 K/UL (ref 1.8–7.7)
NEUTROPHILS NFR BLD: 68.3 % (ref 38–73)
NITRITE UR QL STRIP: NEGATIVE
NRBC BLD-RTO: 0 /100 WBC
PH UR STRIP: 6 [PH] (ref 5–8)
PLATELET # BLD AUTO: 195 K/UL (ref 150–450)
PMV BLD AUTO: 10.6 FL (ref 9.2–12.9)
POTASSIUM SERPL-SCNC: 3.8 MMOL/L (ref 3.5–5.1)
PROT SERPL-MCNC: 7.4 G/DL (ref 6–8.4)
PROT UR QL STRIP: NEGATIVE
RBC # BLD AUTO: 4.29 M/UL (ref 4–5.4)
RBC #/AREA URNS HPF: 3 /HPF (ref 0–4)
SODIUM SERPL-SCNC: 141 MMOL/L (ref 136–145)
SP GR UR STRIP: 1.01 (ref 1–1.03)
SQUAMOUS #/AREA URNS HPF: 5 /HPF
TROPONIN I SERPL DL<=0.01 NG/ML-MCNC: <0.006 NG/ML (ref 0–0.03)
URN SPEC COLLECT METH UR: ABNORMAL
UROBILINOGEN UR STRIP-ACNC: NEGATIVE EU/DL
WBC # BLD AUTO: 8.96 K/UL (ref 3.9–12.7)
WBC #/AREA URNS HPF: 19 /HPF (ref 0–5)

## 2025-01-20 PROCEDURE — 80053 COMPREHEN METABOLIC PANEL: CPT | Performed by: EMERGENCY MEDICINE

## 2025-01-20 PROCEDURE — 85379 FIBRIN DEGRADATION QUANT: CPT | Performed by: EMERGENCY MEDICINE

## 2025-01-20 PROCEDURE — 93010 ELECTROCARDIOGRAM REPORT: CPT | Mod: ,,, | Performed by: INTERNAL MEDICINE

## 2025-01-20 PROCEDURE — 85025 COMPLETE CBC W/AUTO DIFF WBC: CPT | Performed by: EMERGENCY MEDICINE

## 2025-01-20 PROCEDURE — 93005 ELECTROCARDIOGRAM TRACING: CPT

## 2025-01-20 PROCEDURE — 99284 EMERGENCY DEPT VISIT MOD MDM: CPT | Mod: 25

## 2025-01-20 PROCEDURE — 87088 URINE BACTERIA CULTURE: CPT | Performed by: EMERGENCY MEDICINE

## 2025-01-20 PROCEDURE — 86803 HEPATITIS C AB TEST: CPT | Performed by: EMERGENCY MEDICINE

## 2025-01-20 PROCEDURE — 81000 URINALYSIS NONAUTO W/SCOPE: CPT | Performed by: EMERGENCY MEDICINE

## 2025-01-20 PROCEDURE — 84484 ASSAY OF TROPONIN QUANT: CPT | Performed by: EMERGENCY MEDICINE

## 2025-01-20 PROCEDURE — 87389 HIV-1 AG W/HIV-1&-2 AB AG IA: CPT | Performed by: EMERGENCY MEDICINE

## 2025-01-20 PROCEDURE — 81025 URINE PREGNANCY TEST: CPT | Performed by: EMERGENCY MEDICINE

## 2025-01-20 PROCEDURE — 87086 URINE CULTURE/COLONY COUNT: CPT | Performed by: EMERGENCY MEDICINE

## 2025-01-21 VITALS
OXYGEN SATURATION: 100 % | DIASTOLIC BLOOD PRESSURE: 59 MMHG | SYSTOLIC BLOOD PRESSURE: 110 MMHG | RESPIRATION RATE: 20 BRPM | TEMPERATURE: 98 F | HEART RATE: 60 BPM | BODY MASS INDEX: 24.4 KG/M2 | WEIGHT: 160.5 LBS

## 2025-01-21 LAB
OHS QRS DURATION: 80 MS
OHS QTC CALCULATION: 410 MS

## 2025-01-21 PROCEDURE — 63600175 PHARM REV CODE 636 W HCPCS: Mod: TB | Performed by: EMERGENCY MEDICINE

## 2025-01-21 PROCEDURE — 96374 THER/PROPH/DIAG INJ IV PUSH: CPT

## 2025-01-21 RX ORDER — KETOROLAC TROMETHAMINE 30 MG/ML
15 INJECTION, SOLUTION INTRAMUSCULAR; INTRAVENOUS
Status: COMPLETED | OUTPATIENT
Start: 2025-01-21 | End: 2025-01-21

## 2025-01-21 RX ORDER — TIZANIDINE 4 MG/1
4 TABLET ORAL EVERY 6 HOURS PRN
Qty: 30 TABLET | Refills: 0 | Status: SHIPPED | OUTPATIENT
Start: 2025-01-21 | End: 2025-01-31

## 2025-01-21 RX ORDER — NAPROXEN 375 MG/1
375 TABLET ORAL 2 TIMES DAILY WITH MEALS
Qty: 14 TABLET | Refills: 0 | Status: SHIPPED | OUTPATIENT
Start: 2025-01-21

## 2025-01-21 RX ADMIN — KETOROLAC TROMETHAMINE 15 MG: 30 INJECTION, SOLUTION INTRAMUSCULAR at 12:01
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
## 2025-01-24 ENCOUNTER — OFFICE VISIT (OUTPATIENT)
Dept: INTERNAL MEDICINE | Facility: CLINIC | Age: 35
End: 2025-01-24
Payer: OTHER GOVERNMENT

## 2025-01-24 VITALS
SYSTOLIC BLOOD PRESSURE: 114 MMHG | WEIGHT: 161.5 LBS | HEIGHT: 68 IN | BODY MASS INDEX: 24.48 KG/M2 | DIASTOLIC BLOOD PRESSURE: 70 MMHG | HEART RATE: 83 BPM | OXYGEN SATURATION: 98 %

## 2025-01-24 DIAGNOSIS — F98.8 ATTENTION DEFICIT DISORDER (ADD) WITHOUT HYPERACTIVITY: ICD-10-CM

## 2025-01-24 DIAGNOSIS — S29.011D MUSCLE STRAIN OF CHEST WALL, SUBSEQUENT ENCOUNTER: Primary | ICD-10-CM

## 2025-01-24 DIAGNOSIS — F43.10 PTSD (POST-TRAUMATIC STRESS DISORDER): ICD-10-CM

## 2025-01-24 LAB — BACTERIA UR CULT: ABNORMAL

## 2025-01-24 PROCEDURE — 99215 OFFICE O/P EST HI 40 MIN: CPT | Mod: PBBFAC | Performed by: FAMILY MEDICINE

## 2025-01-24 PROCEDURE — 99214 OFFICE O/P EST MOD 30 MIN: CPT | Mod: S$PBB,,, | Performed by: FAMILY MEDICINE

## 2025-01-24 PROCEDURE — G2211 COMPLEX E/M VISIT ADD ON: HCPCS | Mod: S$PBB,,, | Performed by: FAMILY MEDICINE

## 2025-01-24 PROCEDURE — 99999 PR PBB SHADOW E&M-EST. PATIENT-LVL V: CPT | Mod: PBBFAC,,, | Performed by: FAMILY MEDICINE

## 2025-01-30 ENCOUNTER — PATIENT MESSAGE (OUTPATIENT)
Dept: INTERNAL MEDICINE | Facility: CLINIC | Age: 35
End: 2025-01-30
Payer: OTHER GOVERNMENT

## 2025-01-31 ENCOUNTER — PATIENT MESSAGE (OUTPATIENT)
Dept: ALLERGY | Facility: CLINIC | Age: 35
End: 2025-01-31
Payer: OTHER GOVERNMENT
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
## 2025-01-31 NOTE — PROGRESS NOTES
Subjective:       Patient ID: Idalia Hooper is a 34 y.o. female.    Chief Complaint: Rib Injury      History of Present Illness    - Patient reports severe back and shoulder pain, a recurrence of an injury from July when her  manipulated her back, resulting in a muscle strain. The pain is sharp and localized, with a palpable muscle spasm over her ribs. It has migrated since its onset, now also affecting her shoulder. The pain never fully resolved from the previous episode in the summer, remaining tight in certain areas.  - Patient has visited the ER twice recently due to this pain. She attempted to use leftover naproxen 500mg and a muscle relaxant from her previous episode, taking about 5 or 6 doses as prescribed, but it did not provide relief. She also tried Excedrin, which helped initially but stopped to be effective with subsequent doses. Patient completed a course of steroids prescribed by the ER doctor yesterday.  - The pain is now severe enough to cause nausea and indigestion, as it wraps around her ribs and into her abdomen. Palpation of certain areas triggers pain, particularly under her breast. Prolonged bed rest is not feasible due to having young children (ages 3 and 5), though she is avoiding lifting them.  - In October, the patient fell and was diagnosed with POTS (Postural Orthostatic Tachycardia Syndrome). She was referred to a cardiologist and taken off her ADHD medication due to palpitations. Since discontinuing her ADHD medication, her short-term memory has significantly declined and her ADHD symptoms have worsened, impacting her daily functionality.  - Patient has developed a complete alcohol intolerance. She denies that the pain is related to eating.  Patient is otherwise without concerns today.    MEDICATIONS:  - Naproxen 500 mg, as needed for pain  - Flexeril (cyclobenzaprine), as needed for muscle spasms  - Steroid (unspecified), recently finished course prescribed by ER  -  "Excedrin, as needed for pain (taken occasionally)    MEDICAL HISTORY:  - POTS (Postural Orthostatic Tachycardia Syndrome)  - ADD (Attention Deficit Disorder)  - Children: 2 children, ages 3 and 5    IMAGING:  - X-rays: Performed at ER visit, results negative    SOCIAL HISTORY:  - Alcohol Use: Developed complete alcohol intolerance, no longer drinks  - Marital status:       ROS:  General: -fever, -chills, -fatigue, -weight gain, -weight loss  Eyes: -eye pain, -vision change  ENMT: -hearing loss, -ear pain, -nasal congestion, -rhinorrhea, -dental problem, -trouble swallowing  Cardiovascular: -chest pain, +PALPITATIONS, -lower extremity edema  Respiratory: -cough, -trouble breathing  Gastrointestinal: -abdominal pain, -abdominal swelling, +NAUSEA, -vomiting, -diarrhea, -constipation, -blood in stool  Genitourinary: -difficulty urinating, -genital problem  Musculoskeletal: -joint pain, +MUSCLE ACHES, +BACK PAIN  Integumentary: -rash  Lymphatics: -swollen glands, -easy bruising  Neurologic: -headache, -dizziness, -numbness, -weakness  Psychiatric: -anxiety, -depression, -sleep difficulty     Objective:     Vitals:    01/24/25 1005   BP: 114/70   Pulse: 83   SpO2: 98%   Weight: 73.2 kg (161 lb 7.8 oz)   Height: 5' 8" (1.727 m)          Physical Exam  Vitals reviewed.   Constitutional:       General: She is not in acute distress.     Appearance: She is well-developed.   HENT:      Head: Normocephalic and atraumatic.   Eyes:      General: Lids are normal. No scleral icterus.     Extraocular Movements: Extraocular movements intact.      Conjunctiva/sclera: Conjunctivae normal.      Pupils: Pupils are equal, round, and reactive to light.   Cardiovascular:      Rate and Rhythm: Normal rate and regular rhythm.      Heart sounds: No murmur heard.     No friction rub. No gallop.   Pulmonary:      Effort: Pulmonary effort is normal.      Breath sounds: Normal breath sounds. No decreased breath sounds, wheezing, rhonchi or " rales.   Chest:      Chest wall: Tenderness present. No crepitus.          Comments: Chest wall tender in area marked with red [place]  Neurological:      Mental Status: She is alert and oriented to person, place, and time.      Cranial Nerves: No cranial nerve deficit.      Gait: Gait normal.   Psychiatric:         Mood and Affect: Mood and affect normal.           Assessment:       1. Muscle strain of chest wall, subsequent encounter    2. PTSD (post-traumatic stress disorder)    3. Attention deficit disorder (ADD) without hyperactivity        Plan:   1. Muscle strain of chest wall, subsequent encounter    2. PTSD (post-traumatic stress disorder)  -     Ambulatory referral/consult to Psychiatry    3. Attention deficit disorder (ADD) without hyperactivity  -     Ambulatory referral/consult to Psychiatry      Assessment & Plan    MUSCULAR PAIN:   Determined current pain is too acute for physical therapy or massage therapy.   Advised to try Rx from ER physician naproxen 375 mg 2 times daily with meals.   Advised the patient to contact the office if naproxen refill is needed after 1 week.   Instructed the patient to avoid heavy lifting and apply heat to the affected area.   Prescribed Zanaflex (tizanidine) 4 mg every 6 hours as needed for muscle spasms.   Instructed the patient to take the medication as scheduled for at least 2-3 days.   Cautioned against driving while taking the muscle relaxer due to potential drowsiness.   Advised the patient to apply heat to the affected area.   Explained that lying in bed excessively may worsen muscular condition.    POSTURAL ORTHOSTATIC TACHYCARDIA SYNDROME (POTS):   Acknowledged patient's POTS diagnosis and its impact on medication choices, particularly ADHD medication.   Require cardiologist clearance before resuming ADHD medication.    ATTENTION DEFICIT DISORDER (ADD):   Noted patient's report of worsening ADHD symptoms and short-term memory issues after discontinuing ADHD  medication.   Awaiting cardiology clearance before potentially resuming ADHD medication.    PSYCHIATRIC REFERRAL:   Referred patient to psychiatry for further management due to case complexity.   Noted patient's stress and anxiety about medical conditions and daily functioning.   Acknowledged the impact of stress on pain perception.   Considered psychiatric referral due to case complexity beyond primary care scope.   Resubmitted referral to psychiatry for comprehensive management of patient's complex case, including PTSD/ADHD.    Patient expressed understanding and agreement with plan.    Visit today included increased complexity associated with the care of the episodic problem muscular pain, which was addressed while instituting co-management of the longitudinal care of the patient due to the serious and/or complex managed problem(s) .    I have evaluated and discussed management associated with medical care services that serve as the continuing focal point for all needed health care services and/or with medical care services that are part of ongoing care related to my patient's single, serious condition or a complex condition(s).    I am providing ongoing care and I am the primary care provider for this patient, and they are being managed, monitored, and/or observed for their chronic conditions over time.     I have addressed their ongoing health maintenance requirements and needs for all health care services and reviewed co-management plans provided by specialty providers when available.    Health Maintenance Due   Topic Date Due    Pneumococcal Vaccines (Age 0-49) (1 of 2 - PCV) Never done    Influenza Vaccine (1) 09/01/2024    COVID-19 Vaccine (3 - 2024-25 season) 09/01/2024         Follow up if symptoms worsen or fail to improve, for keep routine follow up.    This note was generated with the assistance of ambient listening technology. Verbal consent was obtained by the patient and accompanying visitor(s) for  the recording of patient appointment to facilitate this note. I attest to having reviewed and edited the generated note for accuracy, though some syntax or spelling errors may persist. Please contact the author of this note for any clarification.

## 2025-02-01 ENCOUNTER — TELEPHONE (OUTPATIENT)
Dept: EMERGENCY MEDICINE | Facility: HOSPITAL | Age: 35
End: 2025-02-01
Payer: OTHER GOVERNMENT

## 2025-02-04 ENCOUNTER — CLINICAL SUPPORT (OUTPATIENT)
Dept: ALLERGY | Facility: CLINIC | Age: 35
End: 2025-02-04
Payer: OTHER GOVERNMENT

## 2025-02-04 DIAGNOSIS — Z91.038 HYMENOPTERA ALLERGY: Primary | ICD-10-CM

## 2025-02-04 PROCEDURE — 95146 ANTIGEN THERAPY SERVICES: CPT | Mod: S$PBB,,, | Performed by: STUDENT IN AN ORGANIZED HEALTH CARE EDUCATION/TRAINING PROGRAM

## 2025-02-04 PROCEDURE — 95117 IMMUNOTHERAPY INJECTIONS: CPT | Mod: PBBFAC

## 2025-02-04 PROCEDURE — 99999 PR PBB SHADOW E&M-EST. PATIENT-LVL III: CPT | Mod: PBBFAC,,,

## 2025-02-04 PROCEDURE — 95146 ANTIGEN THERAPY SERVICES: CPT | Mod: PBBFAC

## 2025-02-04 PROCEDURE — 95117 IMMUNOTHERAPY INJECTIONS: CPT | Mod: S$PBB,,, | Performed by: STUDENT IN AN ORGANIZED HEALTH CARE EDUCATION/TRAINING PROGRAM

## 2025-02-04 RX ORDER — CETIRIZINE HYDROCHLORIDE 10 MG/1
20 TABLET ORAL
Status: CANCELLED | OUTPATIENT
Start: 2025-02-04 | End: 2025-02-04

## 2025-02-04 RX ORDER — CETIRIZINE HYDROCHLORIDE 10 MG/1
20 TABLET ORAL
Status: COMPLETED | OUTPATIENT
Start: 2025-02-04 | End: 2025-02-04

## 2025-02-04 RX ADMIN — CETIRIZINE HYDROCHLORIDE 20 MG: 10 TABLET ORAL at 02:02

## 2025-02-04 NOTE — PROGRESS NOTES
Xolair administered.  Patient waited in clinic for 30 mins for observation.  Patient had Epi Pen on hand.  No reaction noted. Just redness.

## 2025-02-11 ENCOUNTER — PROCEDURE VISIT (OUTPATIENT)
Dept: ALLERGY | Facility: CLINIC | Age: 35
End: 2025-02-11
Payer: OTHER GOVERNMENT

## 2025-02-11 ENCOUNTER — OFFICE VISIT (OUTPATIENT)
Dept: ALLERGY | Facility: CLINIC | Age: 35
End: 2025-02-11
Payer: OTHER GOVERNMENT

## 2025-02-11 VITALS
TEMPERATURE: 98 F | BODY MASS INDEX: 23.95 KG/M2 | WEIGHT: 158.06 LBS | DIASTOLIC BLOOD PRESSURE: 69 MMHG | HEIGHT: 68 IN | SYSTOLIC BLOOD PRESSURE: 103 MMHG | HEART RATE: 61 BPM

## 2025-02-11 DIAGNOSIS — J45.20 MILD INTERMITTENT ASTHMA WITHOUT COMPLICATION: ICD-10-CM

## 2025-02-11 DIAGNOSIS — Z91.038 HYMENOPTERA ALLERGY: Primary | ICD-10-CM

## 2025-02-11 DIAGNOSIS — Z91.038 HYMENOPTERA ALLERGY: ICD-10-CM

## 2025-02-11 DIAGNOSIS — J30.1 SEASONAL ALLERGIC RHINITIS DUE TO POLLEN: Primary | ICD-10-CM

## 2025-02-11 DIAGNOSIS — J30.89 ALLERGIC RHINITIS DUE TO DUST MITE: ICD-10-CM

## 2025-02-11 DIAGNOSIS — J30.81 ALLERGIC RHINITIS DUE TO ANIMAL DANDER: ICD-10-CM

## 2025-02-11 PROCEDURE — 95146 ANTIGEN THERAPY SERVICES: CPT | Mod: S$PBB,,, | Performed by: STUDENT IN AN ORGANIZED HEALTH CARE EDUCATION/TRAINING PROGRAM

## 2025-02-11 PROCEDURE — 99214 OFFICE O/P EST MOD 30 MIN: CPT | Mod: PBBFAC | Performed by: STUDENT IN AN ORGANIZED HEALTH CARE EDUCATION/TRAINING PROGRAM

## 2025-02-11 PROCEDURE — 95146 ANTIGEN THERAPY SERVICES: CPT | Mod: PBBFAC

## 2025-02-11 PROCEDURE — 95117 IMMUNOTHERAPY INJECTIONS: CPT | Mod: S$PBB,,, | Performed by: STUDENT IN AN ORGANIZED HEALTH CARE EDUCATION/TRAINING PROGRAM

## 2025-02-11 PROCEDURE — 95117 IMMUNOTHERAPY INJECTIONS: CPT | Mod: PBBFAC

## 2025-02-11 PROCEDURE — 99999 PR PBB SHADOW E&M-EST. PATIENT-LVL IV: CPT | Mod: PBBFAC,,, | Performed by: STUDENT IN AN ORGANIZED HEALTH CARE EDUCATION/TRAINING PROGRAM

## 2025-02-11 PROCEDURE — 99214 OFFICE O/P EST MOD 30 MIN: CPT | Mod: 25,S$PBB,, | Performed by: STUDENT IN AN ORGANIZED HEALTH CARE EDUCATION/TRAINING PROGRAM

## 2025-02-11 NOTE — PROGRESS NOTES
Allergy and Immunology  Established Patient Clinic Note    Date: 2/11/2025  Chief Complaint   Patient presents with    Follow-up     History  Idalia Hooper is a 34 y.o. female being seen for follow-up today.    Allergic Rhinitis due to cat, dog, dust mites, and grass/weed pollen  - No acute complaints   - Continue current medications     Hymenoptera Allergy   Large Local Reaction   - On VIT, tolerating without reaction   - No hx of anaphylaxis     Syncope and Collapse  POTS?  - No acute complaints   - Does not appear to mast cell related     Allergies, PMH, PSH, Social, and Family History were reviewed.    Current Outpatient Medications on File Prior to Visit   Medication Sig Dispense Refill    albuterol (PROVENTIL/VENTOLIN HFA) 90 mcg/actuation inhaler Inhale 2 puffs into the lungs every 4 (four) hours as needed for Wheezing. 18 g 2    butalbital-acetaminophen-caffeine -40 mg (FIORICET, ESGIC) -40 mg per tablet Take 1 tablet by mouth every 6 (six) hours as needed for Pain. 15 tablet 0    cyclobenzaprine (FLEXERIL) 5 MG tablet Take 10 mg by mouth nightly.      metroNIDAZOLE (FLAGYL) 500 MG tablet Take 500 mg by mouth.      midodrine (PROAMATINE) 2.5 MG Tab Take 1 tablet (2.5 mg total) by mouth 3 (three) times daily as needed (for BP below 110/70). 360 tablet 1    montelukast (SINGULAIR) 10 mg tablet Take 1 tablet (10 mg total) by mouth once daily. 90 tablet 3    naproxen (NAPROSYN) 375 MG tablet Take 1 tablet (375 mg total) by mouth 2 (two) times daily with meals. 14 tablet 0    sertraline (ZOLOFT) 50 MG tablet Take 2 tablets (100 mg total) by mouth once daily. 30 tablet 10    sodium chloride 1,000 mg TbSO oral tablet Take 1 tablet (1,000 mg total) by mouth 3 (three) times daily. 180 tablet 1    triamcinolone acetonide 0.1% (KENALOG) 0.1 % ointment Apply topically 2 (two) times daily. 80 g 11    valACYclovir (VALTREX) 500 MG tablet Take 1 tablet (500 mg total) by mouth 2 (two) times daily. 30  tablet 4    budesonide-formoterol 160-4.5 mcg (SYMBICORT) 160-4.5 mcg/actuation HFAA Inhale 2 puffs into the lungs every 12 (twelve) hours. Controller for 14 days 6 g 0    EPINEPHrine (EPIPEN 2-MARTITA) 0.3 mg/0.3 mL AtIn Inject 0.3 mLs (0.3 mg total) into the muscle once. for 1 dose 0.3 mL 0    famotidine (PEPCID) 40 MG tablet Take 1 tablet (40 mg total) by mouth 2 (two) times daily as needed for Heartburn (indegestion). 30 tablet 2     No current facility-administered medications on file prior to visit.     Physical Examination  Vitals:    02/11/25 1034   BP: 103/69   Pulse: 61   Temp: 97.6 °F (36.4 °C)     GENERAL:  female in no apparent distress and well developed and well nourished  HEAD:  Normocephalic, without obvious abnormality, atraumatic  EYES: sclera anicteric, conjunctiva normochromic  EARS: normal TM's and external ear canals both ears  NOSE: without erythema or discharge, clear discharge, turbinates normal    OROPHARYNX: moist mucous membranes without erythema, exudates or petechiae  LYMPH NODES: normal, supple, no lymphadenopathy  LUNGS: clear to auscultation, no wheezes, rales or rhonchi, symmetric air entry.  HEART: normal rate, regular rhythm, normal S1, S2, no murmurs, rubs, clicks or gallops.  ABDOMEN: soft, nontender, nondistended, no masses or organomegaly.  MUSCULOSKELETAL: no gross joint deformity or swelling.  NEURO: alert, oriented, normal speech, no focal findings or movement disorder noted.  SKIN: normal coloration and turgor, no rashes, no suspicious skin lesions noted.     Assessment/Plan:   Problem List Items Addressed This Visit       Mild intermittent asthma without complication    Overview     Stable on albuterol p.r.n.         Hymenoptera allergy    Overview     - Wasp: large local reactions last for at least 1 week   - Patient with no hx of anaphylaxis  - Avid  and frequent yard work with frequent exposure   - Patient with multiple stings annually - considering AIT for large  local         Seasonal allergic rhinitis due to pollen - Primary    Overview     - 09/17/2024: Serum IgE positive to cat, dog, dust mites, and grass/weed pollen         Allergic rhinitis due to dust mite    Overview     - 09/17/2024: Serum IgE positive to cat, dog, dust mites, and grass/weed pollen         Allergic rhinitis due to animal dander    Overview     - 09/17/2024: Serum IgE positive to cat, dog, dust mites, and grass/weed pollen          - AR - no acute complaints and controlled at this time   - Educated on environmental controls   - Hymenoptera - large local reaction - currently on VIT   - Continue EpiPen PRN for VIT   - Asthma - well controlled with no acute complaints   - Inhaler education and ED precautions     Follow up:  Follow up in about 6 months (around 8/11/2025).    Kesler Bourgoyne, MD [facility]  Allergy and Immunology

## 2025-02-18 ENCOUNTER — PROCEDURE VISIT (OUTPATIENT)
Dept: ALLERGY | Facility: CLINIC | Age: 35
End: 2025-02-18
Payer: OTHER GOVERNMENT

## 2025-02-18 ENCOUNTER — PATIENT MESSAGE (OUTPATIENT)
Dept: INTERNAL MEDICINE | Facility: CLINIC | Age: 35
End: 2025-02-18
Payer: OTHER GOVERNMENT

## 2025-02-18 DIAGNOSIS — Z91.038 HYMENOPTERA ALLERGY: Primary | ICD-10-CM

## 2025-02-18 PROCEDURE — 95117 IMMUNOTHERAPY INJECTIONS: CPT | Mod: PBBFAC

## 2025-02-18 PROCEDURE — 95146 ANTIGEN THERAPY SERVICES: CPT | Mod: PBBFAC

## 2025-03-07 ENCOUNTER — PROCEDURE VISIT (OUTPATIENT)
Dept: ALLERGY | Facility: CLINIC | Age: 35
End: 2025-03-07
Payer: OTHER GOVERNMENT

## 2025-03-07 DIAGNOSIS — Z91.038 HYMENOPTERA ALLERGY: Primary | ICD-10-CM

## 2025-03-20 ENCOUNTER — PROCEDURE VISIT (OUTPATIENT)
Dept: ALLERGY | Facility: CLINIC | Age: 35
End: 2025-03-20
Payer: OTHER GOVERNMENT

## 2025-03-20 DIAGNOSIS — Z91.038 HYMENOPTERA ALLERGY: Primary | ICD-10-CM

## 2025-03-20 PROCEDURE — 95146 ANTIGEN THERAPY SERVICES: CPT | Mod: PBBFAC

## 2025-03-20 PROCEDURE — 95117 IMMUNOTHERAPY INJECTIONS: CPT | Mod: PBBFAC

## 2025-03-20 RX ORDER — CETIRIZINE HYDROCHLORIDE 10 MG/1
20 TABLET ORAL
Status: COMPLETED | OUTPATIENT
Start: 2025-03-20 | End: 2025-03-20

## 2025-03-20 RX ADMIN — CETIRIZINE HYDROCHLORIDE 20 MG: 10 TABLET ORAL at 01:03

## 2025-04-17 ENCOUNTER — PROCEDURE VISIT (OUTPATIENT)
Dept: ALLERGY | Facility: CLINIC | Age: 35
End: 2025-04-17
Payer: OTHER GOVERNMENT

## 2025-04-17 DIAGNOSIS — Z91.038 HYMENOPTERA ALLERGY: Primary | ICD-10-CM

## 2025-04-17 PROCEDURE — 95146 ANTIGEN THERAPY SERVICES: CPT | Mod: PBBFAC

## 2025-04-17 PROCEDURE — 95117 IMMUNOTHERAPY INJECTIONS: CPT | Mod: PBBFAC
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
## 2025-05-22 ENCOUNTER — OFFICE VISIT (OUTPATIENT)
Dept: RHEUMATOLOGY | Facility: CLINIC | Age: 35
End: 2025-05-22
Payer: OTHER GOVERNMENT

## 2025-05-22 ENCOUNTER — OFFICE VISIT (OUTPATIENT)
Dept: CARDIOLOGY | Facility: CLINIC | Age: 35
End: 2025-05-22
Payer: OTHER GOVERNMENT

## 2025-05-22 ENCOUNTER — LAB VISIT (OUTPATIENT)
Dept: LAB | Facility: HOSPITAL | Age: 35
End: 2025-05-22
Attending: INTERNAL MEDICINE
Payer: OTHER GOVERNMENT

## 2025-05-22 VITALS
DIASTOLIC BLOOD PRESSURE: 69 MMHG | WEIGHT: 154 LBS | SYSTOLIC BLOOD PRESSURE: 108 MMHG | BODY MASS INDEX: 23.34 KG/M2 | HEART RATE: 73 BPM | HEIGHT: 68 IN

## 2025-05-22 VITALS
BODY MASS INDEX: 23.49 KG/M2 | WEIGHT: 155 LBS | HEIGHT: 68 IN | HEART RATE: 73 BPM | DIASTOLIC BLOOD PRESSURE: 69 MMHG | RESPIRATION RATE: 16 BRPM | OXYGEN SATURATION: 98 % | SYSTOLIC BLOOD PRESSURE: 108 MMHG

## 2025-05-22 DIAGNOSIS — M79.7 FIBROMYALGIA: ICD-10-CM

## 2025-05-22 DIAGNOSIS — E55.9 VITAMIN D INSUFFICIENCY: ICD-10-CM

## 2025-05-22 DIAGNOSIS — R00.2 PALPITATIONS: ICD-10-CM

## 2025-05-22 DIAGNOSIS — R06.09 OTHER FORM OF DYSPNEA: ICD-10-CM

## 2025-05-22 DIAGNOSIS — G89.29 CHRONIC LOW BACK PAIN, UNSPECIFIED BACK PAIN LATERALITY, UNSPECIFIED WHETHER SCIATICA PRESENT: ICD-10-CM

## 2025-05-22 DIAGNOSIS — M85.38 OSTEITIS CONDENSANS ILII: Primary | ICD-10-CM

## 2025-05-22 DIAGNOSIS — F98.8 ATTENTION DEFICIT DISORDER, UNSPECIFIED TYPE: ICD-10-CM

## 2025-05-22 DIAGNOSIS — Z71.89 COUNSELING ON HEALTH PROMOTION AND DISEASE PREVENTION: ICD-10-CM

## 2025-05-22 DIAGNOSIS — M25.50 HYPERMOBILITY ARTHRALGIA: ICD-10-CM

## 2025-05-22 DIAGNOSIS — Z86.16 HISTORY OF COVID-19: ICD-10-CM

## 2025-05-22 DIAGNOSIS — M85.38 OSTEITIS CONDENSANS ILII: ICD-10-CM

## 2025-05-22 DIAGNOSIS — R52 BREAKTHROUGH PAIN: ICD-10-CM

## 2025-05-22 DIAGNOSIS — F43.10 PTSD (POST-TRAUMATIC STRESS DISORDER): ICD-10-CM

## 2025-05-22 DIAGNOSIS — F41.9 ANXIETY: ICD-10-CM

## 2025-05-22 DIAGNOSIS — J45.20 MILD INTERMITTENT ASTHMA WITHOUT COMPLICATION: ICD-10-CM

## 2025-05-22 DIAGNOSIS — M46.1 SACROILIITIS: ICD-10-CM

## 2025-05-22 DIAGNOSIS — R55 SYNCOPE, UNSPECIFIED SYNCOPE TYPE: Primary | ICD-10-CM

## 2025-05-22 DIAGNOSIS — M54.50 CHRONIC LOW BACK PAIN, UNSPECIFIED BACK PAIN LATERALITY, UNSPECIFIED WHETHER SCIATICA PRESENT: ICD-10-CM

## 2025-05-22 DIAGNOSIS — Z51.81 MEDICATION MONITORING ENCOUNTER: ICD-10-CM

## 2025-05-22 LAB
25(OH)D3+25(OH)D2 SERPL-MCNC: 50 NG/ML (ref 30–96)
CRP SERPL-MCNC: 0.3 MG/L

## 2025-05-22 PROCEDURE — G2211 COMPLEX E/M VISIT ADD ON: HCPCS | Mod: S$PBB,,, | Performed by: INTERNAL MEDICINE

## 2025-05-22 PROCEDURE — 86140 C-REACTIVE PROTEIN: CPT

## 2025-05-22 PROCEDURE — 36415 COLL VENOUS BLD VENIPUNCTURE: CPT

## 2025-05-22 PROCEDURE — 99214 OFFICE O/P EST MOD 30 MIN: CPT | Mod: S$PBB,,, | Performed by: INTERNAL MEDICINE

## 2025-05-22 PROCEDURE — 99999 PR PBB SHADOW E&M-EST. PATIENT-LVL IV: CPT | Mod: PBBFAC,,, | Performed by: INTERNAL MEDICINE

## 2025-05-22 PROCEDURE — 99215 OFFICE O/P EST HI 40 MIN: CPT | Mod: PBBFAC,27 | Performed by: INTERNAL MEDICINE

## 2025-05-22 PROCEDURE — 99214 OFFICE O/P EST MOD 30 MIN: CPT | Mod: PBBFAC | Performed by: INTERNAL MEDICINE

## 2025-05-22 PROCEDURE — 99999 PR PBB SHADOW E&M-EST. PATIENT-LVL V: CPT | Mod: PBBFAC,,, | Performed by: INTERNAL MEDICINE

## 2025-05-22 PROCEDURE — 82306 VITAMIN D 25 HYDROXY: CPT

## 2025-05-22 RX ORDER — CELECOXIB 100 MG/1
100 CAPSULE ORAL 2 TIMES DAILY PRN
Qty: 14 CAPSULE | Refills: 1 | Status: SHIPPED | OUTPATIENT
Start: 2025-05-22 | End: 2025-05-29

## 2025-05-22 RX ORDER — MIDODRINE HYDROCHLORIDE 2.5 MG/1
2.5 TABLET ORAL 3 TIMES DAILY PRN
Qty: 360 TABLET | Refills: 1 | Status: SHIPPED | OUTPATIENT
Start: 2025-05-22 | End: 2026-05-22

## 2025-05-22 RX ORDER — SODIUM CHLORIDE 1 G/1
1000 TABLET ORAL 3 TIMES DAILY
Qty: 180 TABLET | Refills: 1 | Status: SHIPPED | OUTPATIENT
Start: 2025-05-22

## 2025-05-22 NOTE — Clinical Note
Hi pt wanted me to reach out to you to discuss restarting Vyvance, pt is stable cardiac wise, needs to monitor BP and pulse while on it. Will need to increase salt and gain weight.
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
## 2025-05-28 ENCOUNTER — CLINICAL SUPPORT (OUTPATIENT)
Dept: REHABILITATION | Facility: HOSPITAL | Age: 35
End: 2025-05-28
Attending: INTERNAL MEDICINE
Payer: OTHER GOVERNMENT

## 2025-05-28 ENCOUNTER — PATIENT MESSAGE (OUTPATIENT)
Dept: INTERNAL MEDICINE | Facility: CLINIC | Age: 35
End: 2025-05-28
Payer: OTHER GOVERNMENT

## 2025-05-28 DIAGNOSIS — M62.81 WEAKNESS OF TRUNK MUSCULATURE: Primary | ICD-10-CM

## 2025-05-28 PROCEDURE — 97161 PT EVAL LOW COMPLEX 20 MIN: CPT | Mod: PN

## 2025-05-28 PROCEDURE — 97530 THERAPEUTIC ACTIVITIES: CPT | Mod: PN

## 2025-05-28 NOTE — PROGRESS NOTES
Outpatient Rehab    Physical Therapy Evaluation    Patient Name: Idalia Hooper  MRN: [id number]  YOB: 1990  Encounter Date: 5/28/2025    Therapy Diagnosis:   Encounter Diagnosis   Name Primary?    Weakness of trunk musculature Yes     Physician: Bigg Blue MD    Physician Orders: Eval and Treat  Medical Diagnosis: Chronic low back pain, unspecified back pain laterality, unspecified whether sciatica present  Hypermobility arthralgia    Visit # / Visits Authorized:  1 / 1  Insurance Authorization Period: 5/28/2025 to 12/31/2025  Date of Evaluation: 5/28/2025  Plan of Care Certification: 5/28/2025 to 7/9/2025     Time In: 1005   Time Out: 1045  Total Time (in minutes): 40   Total Billable Time (in minutes):  35 minutes 1:1 with PT    Intake Outcome Measure for FOTO Survey    Therapist reviewed FOTO scores for Idalia Hooper on 5/28/2025.   FOTO report - see Media section or FOTO account episode details.     Intake Score: 47%    Precautions: POTS      Subjective   History of Present Illness  Idalia is a 34 y.o. female who reports to physical therapy with a chief concern of low back pain.                 History of Present Condition/Illness: Patient presents with c/o of chronic low back. Patient reports pain initially began during her first pregnancy in 2019 with pelvic and back pain, bilaterally. Patient states she was in therapy at that time, but it was mainly pelvic floor focused. Pt reports after her second child she wasn't able to return to receiving treatment. Pt states in time since, she has been diagnosed with fibromyalgia and POTS. Pain is noted to be equally bilateral with radiation into the hips/SI region. Pt reports prolonged positioning and certain movements irritate pain. Numbness/ tingling and shooting pains in her R lower extremity at the posterior lower leg do occur intermittently.     Pain     Patient reports a current pain level of 2/10.  Pain at worst is reported as  8/10.   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       Past Medical History/Physical Systems Review:   Idalia Hooper  has a past medical history of Acne, ADD (attention deficit disorder), Anxiety, Exercise-induced asthma, Fibromyalgia, History of febrile seizure, HSV-1 infection, Insomnia, POTS (postural orthostatic tachycardia syndrome), PTSD (post-traumatic stress disorder), and Trichomonas vaginitis.    Idalia Hooper  has a past surgical history that includes [place] tooth extraction (Bilateral, 07/2016); Dilation and curettage of uterus (08/10/2018); Upper gastrointestinal endoscopy; and Colonoscopy (N/A, 3/8/2022).    Idalia has a current medication list which includes the following prescription(s): albuterol, budesonide-formoterol 160-4.5 mcg, butalbital-acetaminophen-caffeine -40 mg, celecoxib, cyclobenzaprine, epinephrine, famotidine, midodrine, montelukast, sertraline, sodium chloride, triamcinolone acetonide 0.1%, and valacyclovir.    Review of patient's allergies indicates:   Allergen Reactions    Cefaclor Other (See Comments)     Pt states she had seizure and fever as a 18 month old.  Seizure      Doxycycline Rash    Meloxicam Shortness Of Breath    Ambien [zolpidem]     Xanax [alprazolam] Hallucinations     Sleep paralysis    Ciprofloxacin Rash    Latex, natural rubber Rash        Objective      RANGE OF MOTION:    Lumbar AROM   (% of norm ROM present)  Right  (spine) Left   Pain/Dysfunction with Movement   Lumbar Flexion  100% --- Pulling behind the legs     Lumbar Extension  75% --- Pain increase, but also somewhat good   Lumbar Side Bending  100% 100% Pain ipsilateral side pain B   Lumbar Rotation 100% 100% ---     Hip AROM is WNL, though pain limitations in mobility are noted with B IR.    STRENGTH:    L/E MMT Right Left Pain/Dysfunction with Movement Goal   Hip Flexion  4+/5 4+/5 --- 5/5 B   Hip Extension  4/5 4-/5 --- 4+/5 B   Hip Abduction  4+/5 4/5 --- 5/5 B   Knee Extension 5/5 5/5 --- ---   Knee Flexion 5/5 5/5 --- ---   Hip IR 4/5 4/5 Pain increase B  4+/5 B   Hip ER 4/5 4/5 --- 4+/5 B       MUSCLE LENGTH:     Muscle Tested  Right Left    Piriformis  decreased decreased     SPECIAL TESTS:     Right  (spine) Left    SLR  Positive Negative     Palpation: TTP at upper lumbar spinous processes    Movement Analysis Observations noted   Full Plank on elbows  21 seconds with pain onset and shaking          Treatment: (15 minutes)   Therapeutic Activity  TA 1: HEP and POC education         Assessment & Plan   Assessment  Idalia presents with a condition of Low complexity.   Presentation of Symptoms: Stable  Will Comorbidities Impact Care: Yes  See patient co-morbidities listed in patient past medical history above in subjective section of evaluation      Functional Limitations: Activity tolerance, Carrying objects, Completing self-care activities, Completing work/school activities, Functional mobility, Driving, Pain with ADLs/IADLs, Painful locomotion/ambulation, Performing household chores, Sitting tolerance, Squatting, Standing tolerance, Sexual function  Impairments: Abnormal muscle firing, Abnormal or restricted range of motion, Activity intolerance, Impaired physical strength, Pain with functional activity, Lack of appropriate home exercise program    Prognosis: Good  Assessment Details: Patient is a 34 year old female presenting to outpatient physical therapy with diagnosed chronic low back pain and hypermobility arthralgia. Patient presents with deficits in  strength, range of motion, flexibility, TTP, and core endurance/ motor control. Patient is being limited with tolerance of activities of daily living when caring for her two young children independently, in relation to deficits and pain noted.      Plan  From a physical therapy perspective, the patient would benefit from: Skilled Rehab Services    Planned therapy interventions include: Therapeutic exercise, Therapeutic activities, Neuromuscular re-education, Manual therapy, ADLs/IADLs, Aquatic therapy, Canalith repositioning, Orthotic management and training, Lymphatic compression wrapping, Group therapy, Gait training, Community/work reintegration, Cognitive functional training, Prosthetic management and training, Wheelchair management, Wound care, and Other (Comment). Dry Needling   Planned modalities to include: Biofeedback, Cryotherapy (cold pack), Electrical stimulation - attended, Electrical stimulation - passive/unattended, Mechanical traction, Thermotherapy (hot pack), and Ultrasound.        Visit Frequency: 2 times Per Week for 6 Weeks.       This plan was discussed with Patient.   Discussion participants: Agreed Upon Plan of Care         The patient's spiritual, cultural, and educational needs were considered, and the patient is agreeable to the plan of care and goals.     Education             No show policy, HEP, plan of care, PT role and benefits        Goals:   Active       Functional outcome       Patient will show a significant change in FOTO score to 60 or better to demonstrate subjective improvement in overall function.       Start:  05/28/25    Expected End:  07/09/25            Patient will demonstrate independence in home program for support of progression       Start:  05/28/25    Expected End:  06/18/25               Maintaining body position       Patient will maintain plank of elbow positioning for 40 seconds or better without pain increase and minimal to no shaking.       Start:  05/28/25     Expected End:  07/09/25               Pain       Patient will report pain of 5/10 at worst demonstrating a reduction of overall pain       Start:  05/28/25    Expected End:  06/18/25               Strength       Patient will demonstrate strength improvements to stated goals listed in objective section of evaluation.        Start:  05/28/25    Expected End:  07/09/25                Anusha Grier PT

## 2025-05-29 ENCOUNTER — PROCEDURE VISIT (OUTPATIENT)
Dept: ALLERGY | Facility: CLINIC | Age: 35
End: 2025-05-29
Payer: OTHER GOVERNMENT

## 2025-05-29 DIAGNOSIS — Z91.038 HYMENOPTERA ALLERGY: Primary | ICD-10-CM

## 2025-05-29 PROCEDURE — 95146 ANTIGEN THERAPY SERVICES: CPT | Mod: PBBFAC

## 2025-05-29 PROCEDURE — 95117 IMMUNOTHERAPY INJECTIONS: CPT | Mod: PBBFAC

## 2025-05-29 PROCEDURE — 95146 ANTIGEN THERAPY SERVICES: CPT | Mod: S$PBB,,, | Performed by: STUDENT IN AN ORGANIZED HEALTH CARE EDUCATION/TRAINING PROGRAM

## 2025-05-29 PROCEDURE — 95117 IMMUNOTHERAPY INJECTIONS: CPT | Mod: S$PBB,,, | Performed by: STUDENT IN AN ORGANIZED HEALTH CARE EDUCATION/TRAINING PROGRAM

## 2025-06-03 ENCOUNTER — CLINICAL SUPPORT (OUTPATIENT)
Dept: REHABILITATION | Facility: HOSPITAL | Age: 35
End: 2025-06-03
Payer: OTHER GOVERNMENT

## 2025-06-03 DIAGNOSIS — M62.81 WEAKNESS OF TRUNK MUSCULATURE: Primary | ICD-10-CM

## 2025-06-03 PROCEDURE — 97112 NEUROMUSCULAR REEDUCATION: CPT | Mod: PN

## 2025-06-03 PROCEDURE — 97110 THERAPEUTIC EXERCISES: CPT | Mod: PN

## 2025-06-05 ENCOUNTER — DOCUMENTATION ONLY (OUTPATIENT)
Dept: REHABILITATION | Facility: HOSPITAL | Age: 35
End: 2025-06-05
Payer: OTHER GOVERNMENT

## 2025-06-05 ENCOUNTER — CLINICAL SUPPORT (OUTPATIENT)
Dept: REHABILITATION | Facility: HOSPITAL | Age: 35
End: 2025-06-05
Payer: OTHER GOVERNMENT

## 2025-06-05 DIAGNOSIS — M62.81 WEAKNESS OF TRUNK MUSCULATURE: Primary | ICD-10-CM

## 2025-06-05 PROCEDURE — 97112 NEUROMUSCULAR REEDUCATION: CPT | Mod: PN,CQ

## 2025-06-05 PROCEDURE — 97140 MANUAL THERAPY 1/> REGIONS: CPT | Mod: PN,CQ

## 2025-06-05 PROCEDURE — 97110 THERAPEUTIC EXERCISES: CPT | Mod: PN,CQ

## 2025-06-10 ENCOUNTER — CLINICAL SUPPORT (OUTPATIENT)
Dept: REHABILITATION | Facility: HOSPITAL | Age: 35
End: 2025-06-10
Payer: OTHER GOVERNMENT

## 2025-06-10 DIAGNOSIS — M62.81 WEAKNESS OF TRUNK MUSCULATURE: Primary | ICD-10-CM

## 2025-06-10 PROCEDURE — 97112 NEUROMUSCULAR REEDUCATION: CPT | Mod: PN

## 2025-06-10 NOTE — PROGRESS NOTES
"  Outpatient Rehab    Physical Therapy Visit    Patient Name: Idalia Hooper  MRN: [id number]  YOB: 1990  Encounter Date: 6/10/2025    Therapy Diagnosis:   Encounter Diagnosis   Name Primary?    Weakness of trunk musculature Yes       Physician: Bigg Blue MD    Physician Orders: Eval and Treat  Medical Diagnosis: Chronic low back pain, unspecified back pain laterality, unspecified whether sciatica present  Hypermobility arthralgia    Visit # / Visits Authorized:  3 / 20  Insurance Authorization Period: 5/27/2025 to 12/31/2025  Date of Evaluation: 5/28/2025  Plan of Care Certification: 5/28/2025 to 7/9/2025      Time In: 1100   Time Out: 1200  Total Time (in minutes): 60   Total Billable Time (in minutes):  30 minutes 1:1     FOTO: 1/3 (FOTO NV)        Subjective   Patient reports very minimal pain and no soreness today..  Pain reported as 1/10.      Objective    To reassess at next progress note/ plan of care update          Treatment:        Intervention  JointFocus Duration / Intensity  6/10/2025 Duration / Intensity  6/3/2025   TE Shuttle Squats   3 minutes 5 cords   3 minutes 5 cords    NMR Hip isometrics (ball/belt)  -  3 minutes 5" holds    NMR  TA with ball press  2 x 10 5" with alternating arms/legs  3 minutes 5" holds    TE Hamstring stretches  - 2 x 30" B    NMR Bridges 2 x 10  2 x 10 over swiss ball   NMR  PPT 3 x 10 5" holds  2 x 10 5" holds    NMR  Side planks  modified 3 x 15" holds B  modified 3 x 10" holds B   NMR  cat/ cow   x 10   x 10    TE Prayer stretch  With lateral bias x 3 each With lateral bias x 3 each    NMR  Quadruped hip hikes  2x 10 B  x 15 B    NMR SL clamshells  YTB 10 x 10" holds B YTB 10 x 10" holds B   NMR Bird Dogs  X 8 B      PLAN            CPT Codes available for Billing:   (-) minutes of Manual therapy (MT) to improve pain and ROM.  (-) minutes of Therapeutic Exercise (TE) to develop strength, endurance, range of motion, and flexibility.  (30) minutes " of Neuromuscular Re-Education (NMR)  to improve: Balance, Coordination, Kinesthetic, Sense, Proprioception, and Posture.  (-) minutes of Therapeutic Activities (TA) to improve functional performance.  (-) minutes of Gait Training (GT) to improve functional mobility and safety  Unattended Electrical Stimulation (ES) for muscle performance or pain modulation.  Vasopneumatic Device Therapy () for management of swelling/edema. (90940)  BFR: Blood flow restriction applied during exercise  Moist heat for 10 minutes to low back at end of session  NP or (-): Not Performed       Assessment & Plan   Assessment: Patient reports she is feeling better after inital soreness of last weeks sessions. Therefore, neuromotor training was progression today. Pt demonstrates improved stability in quadruped and denies true pain increase with additional exercises.  Evaluation/Treatment Tolerance: Patient tolerated treatment well    The patient will continue to benefit from skilled outpatient physical therapy in order to address the deficits listed in the problem list on the initial evaluation, provide patient and family education, and maximize the patients level of independence in the home and community environments.     The patient's spiritual, cultural, and educational needs were considered, and the patient is agreeable to the plan of care and goals.         Plan: Plan to progress per tolerance within POC    Goals:   Active       Functional outcome       Patient will show a significant change in FOTO score to 60 or better to demonstrate subjective improvement in overall function.       Start:  05/28/25    Expected End:  07/09/25            Patient will demonstrate independence in home program for support of progression       Start:  05/28/25    Expected End:  06/18/25               Maintaining body position       Patient will maintain plank of elbow positioning for 40 seconds or better without pain increase and minimal to no shaking.        Start:  05/28/25    Expected End:  07/09/25               Pain       Patient will report pain of 5/10 at worst demonstrating a reduction of overall pain       Start:  05/28/25    Expected End:  06/18/25               Strength       Patient will demonstrate strength improvements to stated goals listed in objective section of evaluation.        Start:  05/28/25    Expected End:  07/09/25                  Anusha Grier PT

## 2025-06-17 ENCOUNTER — CLINICAL SUPPORT (OUTPATIENT)
Dept: REHABILITATION | Facility: HOSPITAL | Age: 35
End: 2025-06-17
Payer: OTHER GOVERNMENT

## 2025-06-17 ENCOUNTER — HOSPITAL ENCOUNTER (OUTPATIENT)
Dept: RADIOLOGY | Facility: HOSPITAL | Age: 35
Discharge: HOME | End: 2025-06-17
Attending: INTERNAL MEDICINE
Payer: OTHER GOVERNMENT

## 2025-06-17 DIAGNOSIS — M46.1 SACROILIITIS: ICD-10-CM

## 2025-06-17 DIAGNOSIS — M62.81 WEAKNESS OF TRUNK MUSCULATURE: Primary | ICD-10-CM

## 2025-06-17 PROCEDURE — 97112 NEUROMUSCULAR REEDUCATION: CPT | Mod: PN

## 2025-06-17 PROCEDURE — 25500020 PHARM REV CODE 255: Performed by: INTERNAL MEDICINE

## 2025-06-17 PROCEDURE — 97110 THERAPEUTIC EXERCISES: CPT | Mod: PN

## 2025-06-17 PROCEDURE — 72197 MRI PELVIS W/O & W/DYE: CPT | Mod: TC

## 2025-06-17 PROCEDURE — A9585 GADOBUTROL INJECTION: HCPCS | Performed by: INTERNAL MEDICINE

## 2025-06-17 PROCEDURE — 72197 MRI PELVIS W/O & W/DYE: CPT | Mod: 26,,, | Performed by: RADIOLOGY

## 2025-06-17 RX ORDER — GADOBUTROL 604.72 MG/ML
10 INJECTION INTRAVENOUS
Status: COMPLETED | OUTPATIENT
Start: 2025-06-17 | End: 2025-06-17

## 2025-06-17 RX ADMIN — GADOBUTROL 7 ML: 604.72 INJECTION INTRAVENOUS at 04:06

## 2025-06-17 NOTE — PROGRESS NOTES
"  Outpatient Rehab    Physical Therapy Visit    Patient Name: Idalia Hooper  MRN: [id number]  YOB: 1990  Encounter Date: 6/17/2025    Therapy Diagnosis:   Encounter Diagnosis   Name Primary?    Weakness of trunk musculature Yes     Physician: Bigg Blue MD    Physician Orders: Eval and Treat  Medical Diagnosis: Chronic low back pain, unspecified back pain laterality, unspecified whether sciatica present  Hypermobility arthralgia    Visit # / Visits Authorized:  4 / 20  Insurance Authorization Period: 5/27/2025 to 12/31/2025  Date of Evaluation: 5/28/2025  Plan of Care Certification: 5/28/2025 to 7/9/2025      Time In: 1100   Time Out: 1200  Total Time (in minutes): 60   Total Billable Time (in minutes):  30 minutes 1:1     FOTO: 1/3 (FOTO NV)    Subjective   Patient reports no pain today and very minimal pain over the last week..  Pain reported as 0/10.      Objective    To reassess at next progress note/ plan of care update          Treatment:          Duration / Intensity  6/17/2025 Duration / Intensity  6/10/2025 Duration / Intensity  6/3/2025   TE Shuttle Squats   3 minutes 5 cords   3 minutes 5 cords   3 minutes 5 cords    NMR Hip isometrics (ball/belt)  - -  3 minutes 5" holds    NMR  TA with ball press  2 x 10 5" with alternating arms/legs 2 x 10 5" with alternating arms/legs  3 minutes 5" holds    TE Hamstring stretches  3x30" - 2 x 30" B     TE To Stretch 3x30"       NMR Bridges 3x10 2 x 10  2 x 10 over swiss ball   NMR  PPT  3x10 5"  3 x 10 5" holds  2 x 10 5" holds    NMR  Side planks  modified 3 x 15" holds B modified 3 x 15" holds B  modified 3 x 10" holds B   NMR  cat/ cow    X 10  x 10   x 10    TE Prayer stretch  With lateral bias x 3 ea With lateral bias x 3 each With lateral bias x 3 each    NMR  Quadruped hip hikes  2 x 10 B  2x 10 B  x 15 B    NMR SL clamshells  YTB 10 x 10" holds B YTB 10 x 10" holds B YTB 10 x 10" holds B   NMR Bird Dogs   x8 B     X 8 B      PLAN    "           CPT Codes available for Billing:   (-) minutes of Manual therapy (MT) to improve pain and ROM.  (10) minutes of Therapeutic Exercise (TE) to develop strength, endurance, range of motion, and flexibility.  (20) minutes of Neuromuscular Re-Education (NMR)  to improve: Balance, Coordination, Kinesthetic, Sense, Proprioception, and Posture.  (-) minutes of Therapeutic Activities (TA) to improve functional performance.  (-) minutes of Gait Training (GT) to improve functional mobility and safety  Unattended Electrical Stimulation (ES) for muscle performance or pain modulation.  Vasopneumatic Device Therapy () for management of swelling/edema. (35044)  BFR: Blood flow restriction applied during exercise  Moist heat for 10 minutes to low back at end of session  NP or (-): Not Performed       Assessment & Plan   Assessment: Patient reports she has had overall improved symptoms. Therefore, neuromotor training was progressed today with good tolerance. No hamstring cramping was noted with bridge progress, but hip flexor did tighten with dead bugs. Stretch was added to address this with success.  Pt continues to demonstrate limited motor control with bird dogs, losing balance intermittently.        The patient will continue to benefit from skilled outpatient physical therapy in order to address the deficits listed in the problem list on the initial evaluation, provide patient and family education, and maximize the patients level of independence in the home and community environments.     The patient's spiritual, cultural, and educational needs were considered, and the patient is agreeable to the plan of care and goals.         Plan: Plan to progress per tolerance within POC    Goals:   Active       Functional outcome       Patient will show a significant change in FOTO score to 60 or better to demonstrate subjective improvement in overall function.       Start:  05/28/25    Expected End:  07/09/25            Patient  will demonstrate independence in home program for support of progression       Start:  05/28/25    Expected End:  06/18/25               Maintaining body position       Patient will maintain plank of elbow positioning for 40 seconds or better without pain increase and minimal to no shaking.       Start:  05/28/25    Expected End:  07/09/25               Pain       Patient will report pain of 5/10 at worst demonstrating a reduction of overall pain       Start:  05/28/25    Expected End:  06/18/25               Strength       Patient will demonstrate strength improvements to stated goals listed in objective section of evaluation.        Start:  05/28/25    Expected End:  07/09/25                    Anusha Grier PT

## 2025-06-18 ENCOUNTER — RESULTS FOLLOW-UP (OUTPATIENT)
Dept: RHEUMATOLOGY | Facility: CLINIC | Age: 35
End: 2025-06-18

## 2025-06-24 ENCOUNTER — CLINICAL SUPPORT (OUTPATIENT)
Dept: REHABILITATION | Facility: HOSPITAL | Age: 35
End: 2025-06-24
Payer: OTHER GOVERNMENT

## 2025-06-24 DIAGNOSIS — M62.81 WEAKNESS OF TRUNK MUSCULATURE: Primary | ICD-10-CM

## 2025-06-24 PROCEDURE — 97110 THERAPEUTIC EXERCISES: CPT | Mod: PN

## 2025-06-24 PROCEDURE — 97112 NEUROMUSCULAR REEDUCATION: CPT | Mod: PN

## 2025-06-24 NOTE — PROGRESS NOTES
"    Outpatient Rehab    Physical Therapy Visit    Patient Name: Idalia Hooper  MRN: [id number]  YOB: 1990  Encounter Date: 6/24/2025    Therapy Diagnosis:   Encounter Diagnosis   Name Primary?    Weakness of trunk musculature Yes     Physician: Bigg Blue MD    Physician Orders: Eval and Treat  Medical Diagnosis: Chronic low back pain, unspecified back pain laterality, unspecified whether sciatica present  Hypermobility arthralgia    Visit # / Visits Authorized:  5 / 20  Insurance Authorization Period: 5/27/2025 to 12/31/2025  Date of Evaluation: 5/28/2025  Plan of Care Certification: 5/28/2025 to 7/9/2025      Time In: 1100   Time Out: 1200  Total Time (in minutes): 60   Total Billable Time (in minutes):  30 minutes 1:1     FOTO: 2/3    Subjective   Her hips have been cramping a little (R>L), but overall she is good..  Pain reported as 2/10.      Objective    To reassess at next progress note/ plan of care update          Treatment:       CPT Intervention  JointFocus Duration / Intensity  6/24/2025 Duration / Intensity  6/17/2025 Duration / Intensity  6/10/2025   TE Shuttle Squats  3 minutes 5 cords   3 minutes 5 cords   3 minutes 5 cords    NMR  TA with ball press  X 20  5" with alternating arms/legs 2 x 10 5" with alternating arms/legs 2 x 10 5" with alternating arms/legs   TE Hamstring Stretch 3x30" 3x30" -   TE Figure 4 stretch with rotation 5x 10" holds        TE To Stretch 3x30" 3x30"     TE Piriformis Stretch 4x15"       NMR Bridges 3x10 3x10 2 x 10    NMR PPT 3x10 5"  3x10 5"  3 x 10 5" holds    NMR SL hip abd circles  2 x 20 CW/ CCW        NMR SLR ABCs X 1        NMR Side planks  modified 3 x 15" holds B modified 3 x 15" holds B modified 3 x 15" holds B   NMR cat/ cow  X 15   X 10  x 10    TE Prayer stretch  - With lateral bias x 3 ea With lateral bias x 3 each   NMR Quadruped hip hikes  - 2 x 10 B  2x 10 B   NMR SL clamshells  YTB 10 x 10" holds B YTB 10 x 10" holds B YTB 10 x " "10" holds B   NMR Bird Dogs  -  x8 B     X 8 B    PLAN              CPT Codes available for Billing:   (-) minutes of Manual therapy (MT) to improve pain and ROM.  (10) minutes of Therapeutic Exercise (TE) to develop strength, endurance, range of motion, and flexibility.  (20) minutes of Neuromuscular Re-Education (NMR)  to improve: Balance, Coordination, Kinesthetic, Sense, Proprioception, and Posture.  (-) minutes of Therapeutic Activities (TA) to improve functional performance.  (-) minutes of Gait Training (GT) to improve functional mobility and safety  Unattended Electrical Stimulation (ES) for muscle performance or pain modulation.  Vasopneumatic Device Therapy () for management of swelling/edema. (98616)  BFR: Blood flow restriction applied during exercise  Moist heat for 10 minutes to low back at end of session  NP or (-): Not Performed       Assessment & Plan   Assessment: Complaints are more localized at the anterolateral hips today with intermittent muscle cramps.  Added stretching and functional stability work was done to address this further in today's sessions. Pt tolerates treatment well with increased difficulty, but no cramping noted after stretches.        The patient will continue to benefit from skilled outpatient physical therapy in order to address the deficits listed in the problem list on the initial evaluation, provide patient and family education, and maximize the patients level of independence in the home and community environments.     The patient's spiritual, cultural, and educational needs were considered, and the patient is agreeable to the plan of care and goals.         Plan: Plan to progress per tolerance within POC    Goals:   Active       Functional outcome       Patient will show a significant change in FOTO score to 60 or better to demonstrate subjective improvement in overall function.       Start:  05/28/25    Expected End:  07/09/25            Patient will demonstrate " independence in home program for support of progression       Start:  05/28/25    Expected End:  06/18/25               Maintaining body position       Patient will maintain plank of elbow positioning for 40 seconds or better without pain increase and minimal to no shaking.       Start:  05/28/25    Expected End:  07/09/25               Pain       Patient will report pain of 5/10 at worst demonstrating a reduction of overall pain       Start:  05/28/25    Expected End:  06/18/25               Strength       Patient will demonstrate strength improvements to stated goals listed in objective section of evaluation.        Start:  05/28/25    Expected End:  07/09/25                    Anusha Grier PT

## 2025-06-26 ENCOUNTER — PROCEDURE VISIT (OUTPATIENT)
Dept: ALLERGY | Facility: CLINIC | Age: 35
End: 2025-06-26
Payer: OTHER GOVERNMENT

## 2025-06-26 ENCOUNTER — CLINICAL SUPPORT (OUTPATIENT)
Dept: REHABILITATION | Facility: HOSPITAL | Age: 35
End: 2025-06-26
Payer: OTHER GOVERNMENT

## 2025-06-26 DIAGNOSIS — T63.481A ALLERGIC REACTION TO HYMENOPTERA VENOM: Primary | ICD-10-CM

## 2025-06-26 DIAGNOSIS — M62.81 WEAKNESS OF TRUNK MUSCULATURE: Primary | ICD-10-CM

## 2025-06-26 PROCEDURE — 95146 ANTIGEN THERAPY SERVICES: CPT | Mod: PBBFAC

## 2025-06-26 PROCEDURE — 95117 IMMUNOTHERAPY INJECTIONS: CPT | Mod: PBBFAC

## 2025-06-26 PROCEDURE — 97112 NEUROMUSCULAR REEDUCATION: CPT | Mod: PN

## 2025-06-26 NOTE — PROGRESS NOTES
"Outpatient Rehab    Physical Therapy Visit    Patient Name: Idalia Hooper  MRN: [id number]  YOB: 1990  Encounter Date: 6/26/2025    Therapy Diagnosis:   Encounter Diagnosis   Name Primary?    Weakness of trunk musculature Yes     Physician: Bigg Blue MD    Physician Orders: Eval and Treat  Medical Diagnosis: Chronic low back pain, unspecified back pain laterality, unspecified whether sciatica present  Hypermobility arthralgia    Visit # / Visits Authorized:  6 / 20  Insurance Authorization Period: 5/27/2025 to 12/31/2025  Date of Evaluation: 5/28/2025  Plan of Care Certification: 5/28/2025 to 7/9/2025      Time In: 1100   Time Out: 1200  Total Time (in minutes): 60   Total Billable Time (in minutes):  20 minutes 1:1     FOTO: 2/3    Subjective   Patient reports she is feeling good today. The stretches are reportedly really helping..  Pain reported as 1/10.      Objective    To reassess at next progress note/ plan of care update          Treatment:        CPT Intervention  [facility] Duration / Intensity  6/26/2025 Duration / Intensity  6/24/2025 Duration / Intensity  6/17/2025   TE Shuttle Squats  3 minutes 5 cords  3 minutes 5 cords   3 minutes 5 cords    NMR  TA with ball press  X 20  5" with alternating arms/legs X 20  5" with alternating arms/legs 2 x 10 5" with alternating arms/legs   TE Hamstring Stretch 3x30" 3x30" 3x30"   TE Figure 4 stretch with rotation 5x 10" holds  5x 10" holds      TE To Stretch 3x30" 3x30" 3x30"   TE Piriformis Stretch 4x15" 4x15"     NMR Bridges 3x10 3x10 3x10   NMR PPT 3x10 5" 3x10 5"  3x10 5"    NMR SL hip abd circles  2 x 20 CW/ CCW  2 x 20 CW/ CCW      NMR SLR ABCs X 1  X 1      NMR Side planks  modified 3 x 15" holds B modified 3 x 15" holds B modified 3 x 15" holds B   NMR cat/ cow  - X 15   X 10   TE Prayer stretch  X 5  - With lateral bias x 3 ea   NMR Quadruped hip hikes  3 x 10 B  - 2 x 10 B    NMR SL clamshells  YTB 10 x 10" holds B YTB 10 x 10" " "holds B YTB 10 x 10" holds B   NMR Bird Dogs  - -  x8 B       NMR Tall kneel pelvic thrust with powerband 2 x 10        PLAN                CPT Codes available for Billing:   (-) minutes of Manual therapy (MT) to improve pain and ROM.  (-) minutes of Therapeutic Exercise (TE) to develop strength, endurance, range of motion, and flexibility.  (20) minutes of Neuromuscular Re-Education (NMR)  to improve: Balance, Coordination, Kinesthetic, Sense, Proprioception, and Posture.  (-) minutes of Therapeutic Activities (TA) to improve functional performance.  (-) minutes of Gait Training (GT) to improve functional mobility and safety  Unattended Electrical Stimulation (ES) for muscle performance or pain modulation.  Vasopneumatic Device Therapy () for management of swelling/edema. (28304)  BFR: Blood flow restriction applied during exercise  Moist heat for 10 minutes to low back at end of session  NP or (-): Not Performed       Assessment & Plan   Assessment: Pt reports positive response to added mobility and stability work last session. Continued with this with less progressions today, as patient is adequately challenged by current program. Pt tolerates treatment well with minimal cueing for technique and postural corrections throughout.        The patient will continue to benefit from skilled outpatient physical therapy in order to address the deficits listed in the problem list on the initial evaluation, provide patient and family education, and maximize the patients level of independence in the home and community environments.     The patient's spiritual, cultural, and educational needs were considered, and the patient is agreeable to the plan of care and goals.         Plan: Plan to progress per tolerance within POC    Goals:   Active       Functional outcome       Patient will show a significant change in FOTO score to 60 or better to demonstrate subjective improvement in overall function.       Start:  05/28/25    " Expected End:  07/09/25            Patient will demonstrate independence in home program for support of progression       Start:  05/28/25    Expected End:  06/18/25               Maintaining body position       Patient will maintain plank of elbow positioning for 40 seconds or better without pain increase and minimal to no shaking.       Start:  05/28/25    Expected End:  07/09/25               Pain       Patient will report pain of 5/10 at worst demonstrating a reduction of overall pain       Start:  05/28/25    Expected End:  06/18/25               Strength       Patient will demonstrate strength improvements to stated goals listed in objective section of evaluation.        Start:  05/28/25    Expected End:  07/09/25                    Anusha Grier PT

## 2025-07-02 ENCOUNTER — HOSPITAL ENCOUNTER (EMERGENCY)
Facility: HOSPITAL | Age: 35
Discharge: HOME | End: 2025-07-02
Attending: EMERGENCY MEDICINE
Payer: OTHER GOVERNMENT

## 2025-07-02 VITALS
RESPIRATION RATE: 17 BRPM | TEMPERATURE: 99 F | OXYGEN SATURATION: 98 % | DIASTOLIC BLOOD PRESSURE: 70 MMHG | WEIGHT: 154.31 LBS | SYSTOLIC BLOOD PRESSURE: 114 MMHG | HEIGHT: 68 IN | HEART RATE: 62 BPM | BODY MASS INDEX: 23.39 KG/M2

## 2025-07-02 DIAGNOSIS — K29.20 ACUTE ALCOHOLIC GASTRITIS WITHOUT HEMORRHAGE: Primary | ICD-10-CM

## 2025-07-02 LAB
ABSOLUTE EOSINOPHIL (OHS): 0.12 K/UL
ABSOLUTE MONOCYTE (OHS): 0.38 K/UL (ref 0.3–1)
ABSOLUTE NEUTROPHIL COUNT (OHS): 2.77 K/UL (ref 1.8–7.7)
ALBUMIN SERPL BCP-MCNC: 4.3 G/DL (ref 3.5–5.2)
ALP SERPL-CCNC: 61 UNIT/L (ref 40–150)
ALT SERPL W/O P-5'-P-CCNC: 12 UNIT/L (ref 10–44)
ANION GAP (OHS): 7 MMOL/L (ref 8–16)
AST SERPL-CCNC: 14 UNIT/L (ref 11–45)
BASOPHILS # BLD AUTO: 0.04 K/UL
BASOPHILS NFR BLD AUTO: 0.8 %
BILIRUB SERPL-MCNC: 1.2 MG/DL (ref 0.1–1)
BUN SERPL-MCNC: 7 MG/DL (ref 6–20)
CALCIUM SERPL-MCNC: 9 MG/DL (ref 8.7–10.5)
CHLORIDE SERPL-SCNC: 110 MMOL/L (ref 95–110)
CO2 SERPL-SCNC: 22 MMOL/L (ref 23–29)
CREAT SERPL-MCNC: 0.7 MG/DL (ref 0.5–1.4)
ERYTHROCYTE [DISTWIDTH] IN BLOOD BY AUTOMATED COUNT: 12.1 % (ref 11.5–14.5)
GFR SERPLBLD CREATININE-BSD FMLA CKD-EPI: >60 ML/MIN/1.73/M2
GLUCOSE SERPL-MCNC: 102 MG/DL (ref 70–110)
HCT VFR BLD AUTO: 39.7 % (ref 37–48.5)
HGB BLD-MCNC: 13.6 GM/DL (ref 12–16)
IMM GRANULOCYTES # BLD AUTO: 0.02 K/UL (ref 0–0.04)
IMM GRANULOCYTES NFR BLD AUTO: 0.4 % (ref 0–0.5)
LIPASE SERPL-CCNC: 18 U/L (ref 4–60)
LYMPHOCYTES # BLD AUTO: 1.53 K/UL (ref 1–4.8)
MCH RBC QN AUTO: 30.5 PG (ref 27–31)
MCHC RBC AUTO-ENTMCNC: 34.3 G/DL (ref 32–36)
MCV RBC AUTO: 89 FL (ref 82–98)
NUCLEATED RBC (/100WBC) (OHS): 0 /100 WBC
PLATELET # BLD AUTO: 176 K/UL (ref 150–450)
PMV BLD AUTO: 10.4 FL (ref 9.2–12.9)
POTASSIUM SERPL-SCNC: 3.8 MMOL/L (ref 3.5–5.1)
PROT SERPL-MCNC: 7.2 GM/DL (ref 6–8.4)
RBC # BLD AUTO: 4.46 M/UL (ref 4–5.4)
RELATIVE EOSINOPHIL (OHS): 2.5 %
RELATIVE LYMPHOCYTE (OHS): 31.5 % (ref 18–48)
RELATIVE MONOCYTE (OHS): 7.8 % (ref 4–15)
RELATIVE NEUTROPHIL (OHS): 57 % (ref 38–73)
SODIUM SERPL-SCNC: 139 MMOL/L (ref 136–145)
WBC # BLD AUTO: 4.86 K/UL (ref 3.9–12.7)

## 2025-07-02 PROCEDURE — 63600175 PHARM REV CODE 636 W HCPCS: Performed by: EMERGENCY MEDICINE

## 2025-07-02 PROCEDURE — 96374 THER/PROPH/DIAG INJ IV PUSH: CPT

## 2025-07-02 PROCEDURE — 96375 TX/PRO/DX INJ NEW DRUG ADDON: CPT

## 2025-07-02 PROCEDURE — 99284 EMERGENCY DEPT VISIT MOD MDM: CPT | Mod: 25

## 2025-07-02 PROCEDURE — 84075 ASSAY ALKALINE PHOSPHATASE: CPT | Performed by: EMERGENCY MEDICINE

## 2025-07-02 PROCEDURE — 85025 COMPLETE CBC W/AUTO DIFF WBC: CPT | Performed by: EMERGENCY MEDICINE

## 2025-07-02 PROCEDURE — 25000003 PHARM REV CODE 250: Performed by: EMERGENCY MEDICINE

## 2025-07-02 PROCEDURE — 83690 ASSAY OF LIPASE: CPT | Performed by: EMERGENCY MEDICINE

## 2025-07-02 RX ORDER — SUCRALFATE 1 G/1
1 TABLET ORAL 4 TIMES DAILY
Qty: 120 TABLET | Refills: 0 | Status: SHIPPED | OUTPATIENT
Start: 2025-07-02 | End: 2025-08-01

## 2025-07-02 RX ORDER — HYDROMORPHONE HYDROCHLORIDE 1 MG/ML
1 INJECTION, SOLUTION INTRAMUSCULAR; INTRAVENOUS; SUBCUTANEOUS
Refills: 0 | Status: COMPLETED | OUTPATIENT
Start: 2025-07-02 | End: 2025-07-02

## 2025-07-02 RX ORDER — FAMOTIDINE 10 MG/ML
20 INJECTION, SOLUTION INTRAVENOUS
Status: COMPLETED | OUTPATIENT
Start: 2025-07-02 | End: 2025-07-02

## 2025-07-02 RX ORDER — SUCRALFATE 1 G/10ML
1 SUSPENSION ORAL
Status: COMPLETED | OUTPATIENT
Start: 2025-07-02 | End: 2025-07-02

## 2025-07-02 RX ORDER — MORPHINE SULFATE 4 MG/ML
4 INJECTION, SOLUTION INTRAMUSCULAR; INTRAVENOUS
Refills: 0 | Status: COMPLETED | OUTPATIENT
Start: 2025-07-02 | End: 2025-07-02

## 2025-07-02 RX ORDER — ONDANSETRON HYDROCHLORIDE 2 MG/ML
4 INJECTION, SOLUTION INTRAVENOUS
Status: COMPLETED | OUTPATIENT
Start: 2025-07-02 | End: 2025-07-02

## 2025-07-02 RX ORDER — METOCLOPRAMIDE 10 MG/1
10 TABLET ORAL EVERY 8 HOURS PRN
Qty: 30 TABLET | Refills: 0 | Status: SHIPPED | OUTPATIENT
Start: 2025-07-02

## 2025-07-02 RX ORDER — PANTOPRAZOLE SODIUM 20 MG/1
40 TABLET, DELAYED RELEASE ORAL DAILY
Qty: 60 TABLET | Refills: 0 | Status: SHIPPED | OUTPATIENT
Start: 2025-07-02 | End: 2025-08-01

## 2025-07-02 RX ADMIN — ONDANSETRON 4 MG: 2 INJECTION INTRAMUSCULAR; INTRAVENOUS at 08:07

## 2025-07-02 RX ADMIN — HYDROMORPHONE HYDROCHLORIDE 1 MG: 1 INJECTION, SOLUTION INTRAMUSCULAR; INTRAVENOUS; SUBCUTANEOUS at 09:07

## 2025-07-02 RX ADMIN — MORPHINE SULFATE 4 MG: 4 INJECTION INTRAVENOUS at 08:07

## 2025-07-02 RX ADMIN — FAMOTIDINE 20 MG: 10 INJECTION, SOLUTION INTRAVENOUS at 08:07

## 2025-07-02 RX ADMIN — SUCRALFATE 1 G: 1 SUSPENSION ORAL at 07:07

## 2025-07-02 NOTE — DISCHARGE INSTRUCTIONS
Avoid alcohol.  Use Protonix daily that has suppressant medication along with Carafate to protect your stomach.  That has Pepcid and or Tums for breakthrough symptoms.  Continue Zofran for nausea and Reglan for breakthrough nausea.  Follow up with her doctor in 1-2 days for re-evaluation return as needed for any worsening symptoms, problems, questions or concerns
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
## 2025-07-15 ENCOUNTER — CLINICAL SUPPORT (OUTPATIENT)
Dept: REHABILITATION | Facility: HOSPITAL | Age: 35
End: 2025-07-15
Payer: OTHER GOVERNMENT

## 2025-07-15 DIAGNOSIS — M62.81 WEAKNESS OF TRUNK MUSCULATURE: Primary | ICD-10-CM

## 2025-07-15 PROCEDURE — 97110 THERAPEUTIC EXERCISES: CPT | Mod: PN

## 2025-07-15 PROCEDURE — 97112 NEUROMUSCULAR REEDUCATION: CPT | Mod: PN

## 2025-07-15 NOTE — PROGRESS NOTES
Outpatient Rehab    Physical Therapy Progress Note : Updated Plan of Care    Patient Name: Idalia Hooper  MRN: [id number]  YOB: 1990  Encounter Date: 7/15/2025    Therapy Diagnosis:   Encounter Diagnosis   Name Primary?    Weakness of trunk musculature Yes     Physician: Bigg Blue MD    Physician Orders: Eval and Treat  Medical Diagnosis: Chronic low back pain, unspecified back pain laterality, unspecified whether sciatica present  Hypermobility arthralgia  Surgical Diagnosis: Not applicable for this Episode   Surgical Date: Not applicable for this Episode  Days Since Last Surgery: Not applicable for this Episode    Visit # / Visits Authorized:  7 / 20  Insurance Authorization Period: 5/27/2025 to 12/31/2025  Date of Evaluation: 5/28/2025   Plan of Care Certification: 5/29/2025 to 7/9/2025 extend to 8/12/2025     Time In: 1300   Time Out: 1400  Total Time (in minutes): 60   Total Billable Time (in minutes):  25 minutes 1:1    FOTO:  Intake Score: 47%  Survey Score 2: 49%  Survey Score 3: 51%    Precautions:  Additional Precautions and Protocol Details: POTS    Subjective   Patient reports back stifffening starting yesterday secondary to yard work. Pt states she feels significant improvement from the start of therapy and would like to continue physical therapy, as she still feels limited with hip stiffness present. Pt reports pain is no longer higher than a 5/10 at worst.  Pain reported as 3/10.      Objective      STRENGTH:     L/E MMT Right Left Pain/Dysfunction with Movement 7/15 Goal   Hip Flexion  4+/5 4+/5 --- 5/5 B  5/5 B   Hip Extension  4/5 4-/5 --- 4/5 B  4+/5 B   Hip Abduction  4+/5 4/5 --- 4+/5 B  5/5 B   Hip IR 4/5 4/5 Pain increase B  4/5 R 4+/5 L 4+/5 B   Hip ER 4/5 4/5 --- 4+/5 B  4+/5 B      Movement Analysis Observations noted  7/15/2025    Full Plank on elbows  21 seconds with pain onset and shaking  46 seconds with shaking no pain            Treatment:     CPT  "Intervention  JointFocus Duration / Intensity  07/15/2025 Duration / Intensity  6/26/2025 Duration / Intensity  6/24/2025   TE Shuttle Squats    3 minutes 5 cords  3 minutes 5 cords    NMR  TA with ball press  20x 5" alternating arms/legs X 20  5" with alternating arms/legs X 20  5" with alternating arms/legs   TE Hamstring Stretch 3x30" 3x30" 3x30"   TE Figure 4 stretch with rotation 5 x 15"  5x 10" holds  5x 10" holds    TE To Stretch 3 x 30"  3x30" 3x30"   NMR DKTC  X 15 10" holds at top       NMR Bridges 3x10 3x10 3x10   NMR PPT 3x10 5" 3x10 5" 3x10 5"   NMR SL hip abd circles  2 x 20  CW / CCW 2 x 20 CW/ CCW  2 x 20 CW/ CCW    NMR SLR ABCs X 1  X 1  X 1    NMR Side planks  modified 3 x 15" holds B modified 3 x 15" holds B modified 3 x 15" holds B   NMR cat/ cow  X15  - X 15   TE Prayer stretch  X 5  X 5  -   NMR Quadruped hip hikes  - 3 x 10 B  -   NMR SL clamshells  - YTB 10 x 10" holds B YTB 10 x 10" holds B   NMR Bird Dogs  - - -   NMR Tall kneel pelvic thrust with powerband - 2 x 10      PLAN              CPT Codes available for Billing:   (-) minutes of Manual therapy (MT) to improve pain and ROM.  (15) minutes of Therapeutic Exercise (TE) to develop strength, endurance, range of motion, and flexibility.  (10) minutes of Neuromuscular Re-Education (NMR)  to improve: Balance, Coordination, Kinesthetic, Sense, Proprioception, and Posture.  (-) minutes of Therapeutic Activities (TA) to improve functional performance.  (-) minutes of Gait Training (GT) to improve functional mobility and safety  Unattended Electrical Stimulation (ES) for muscle performance or pain modulation.  Vasopneumatic Device Therapy () for management of swelling/edema. (88943)  BFR: Blood flow restriction applied during exercise  NP or (-): Not Performed    Assessment & Plan   Idalia is progressing well with physical therapy, with minimal to moderate complaints of pain or discomfort and significant improvements noted in strength " and core endurance/ stability since initial evaluation; please see exam for details. Idalia continues to have difficulty with increased activity and prolonged positional tolerance for activities of daily living , due to weakness, and flexibility limitations that remain.  FOTO scores noted above indicate the patients perceived functional levels are improving since prior assessment. The above impairments and functional deficits will continue to be addressed over the course of this plan of care. Idalia was educated on continued progression of PT, expectations for the duration of care, updates on goals set at initial evaluation, and home exercise program.  Idalia will continue to benefit from physical therapy in order to further address goals, maximize function and quality of life.      Plan  From a physical therapy perspective, the patient would benefit from: Skilled Rehab Services    Planned therapy interventions include: Therapeutic exercise, Therapeutic activities, Neuromuscular re-education, Manual therapy, ADLs/IADLs, Aquatic therapy, Canalith repositioning, Cognitive functional training, Community/work reintegration, Gait training, Group therapy, Orthotic management and training, Lymphatic compression wrapping, Prosthetic management and training, Sensory integration, Wheelchair management, Work conditioning, Work hardening, Wound care, and Other (Comment). Dry Needling  Planned modalities to include: Biofeedback, Cryotherapy (cold pack), Electrical stimulation - passive/unattended, Electrical stimulation - attended, Mechanical traction, Thermotherapy (hot pack), and Ultrasound.        Visit Frequency: 2 times Per Week for 4 Weeks.       This plan was discussed with Patient.   Discussion participants: Agreed Upon Plan of Care     The patient will continue to benefit from skilled outpatient physical therapy in order to address the deficits listed in the problem list on the initial evaluation, provide patient and  family education, and maximize the patients level of independence in the home and community environments.     The patient's spiritual, cultural, and educational needs were considered, and the patient is agreeable to the plan of care and goals.         Goals:   Active       Functional outcome       Patient will show a significant change in FOTO score to 60 or better to demonstrate subjective improvement in overall function. (Progressing)       Start:  05/28/25    Expected End:  08/12/25            Patient will demonstrate independence in home program for support of progression (Met)       Start:  05/28/25    Expected End:  06/18/25    Resolved:  07/15/25            Strength       Patient will demonstrate strength improvements to stated goals listed in objective section of evaluation.  (Progressing)       Start:  05/28/25    Expected End:  08/12/25              Resolved       Maintaining body position       Patient will maintain plank of elbow positioning for 40 seconds or better without pain increase and minimal to no shaking. (Met)       Start:  05/28/25    Expected End:  07/09/25    Resolved:  07/15/25            Pain       Patient will report pain of 5/10 at worst demonstrating a reduction of overall pain (Met)       Start:  05/28/25    Expected End:  06/18/25    Resolved:  07/15/25             Anusha Grier PT

## 2025-07-16 ENCOUNTER — CLINICAL SUPPORT (OUTPATIENT)
Dept: REHABILITATION | Facility: HOSPITAL | Age: 35
End: 2025-07-16
Payer: OTHER GOVERNMENT

## 2025-07-16 DIAGNOSIS — M62.81 WEAKNESS OF TRUNK MUSCULATURE: Primary | ICD-10-CM

## 2025-07-16 PROCEDURE — 97112 NEUROMUSCULAR REEDUCATION: CPT | Mod: PN

## 2025-07-16 PROCEDURE — 97110 THERAPEUTIC EXERCISES: CPT | Mod: PN

## 2025-07-16 NOTE — PROGRESS NOTES
"  Outpatient Rehab    Physical Therapy Visit    Patient Name: Idalia Hooper  MRN: [id number]  YOB: 1990  Encounter Date: 7/16/2025    Therapy Diagnosis:   Encounter Diagnosis   Name Primary?    Weakness of trunk musculature Yes     Physician: Bigg Blue MD    Physician Orders: Eval and Treat  Medical Diagnosis: Chronic low back pain, unspecified back pain laterality, unspecified whether sciatica present  Hypermobility arthralgia  Surgical Diagnosis: Not applicable for this Episode   Surgical Date: Not applicable for this Episode  Days Since Last Surgery: Not applicable for this Episode    Visit # / Visits Authorized:  8 / 20  Insurance Authorization Period: 5/27/2025 to 12/31/2025  Date of Evaluation: 5/28/2025  Plan of Care Certification: 7/15/2025 to 8/12/2025      Time In: 1100   Time Out: 1200  Total Time (in minutes): 60   Total Billable Time (in minutes):   40 minutes 1:1     FOTO:  Intake Score: 47%  Survey Score 2: 49%  Survey Score 3: 51%    Precautions:  Additional Precautions and Protocol Details: POTS      Subjective   Patient reports soreness after last session. Relived a little by massage. She reports "severe tightness" from low back through glutes..  Pain reported as 4/10. low back and hips    Objective    To be reassessed at next progress note/ plan of care update         Treatment:     CPT Intervention  JointFocus Duration / Intensity  07/16/2025 Duration / Intensity  07/15/2025 Duration / Intensity  6/26/2025   TE Shuttle Squats  3 minutes 5 cords  - 3 minutes 5 cords    NMR  TA with ball press  - 20x 5" alternating arms/legs X 20  5" with alternating arms/legs   TE Hamstring Stretch 3x30" 3x30" 3x30"   TE Figure 4 stretch with rotation 5 x 15"  5 x 15"  5x 10" holds    TE To Stretch 3 x 30"  3 x 30"  3x30"   NMR DKTC  - X 15 10" holds at top     NMR Bridges 3x10 3x10 3x10   NMR PPT 3x10 5" 3x10 5" 3x10 5"   NMR SL hip abd circles  2 x 20  CW / CCW 2 x 20  CW / CCW 2 x " "20 CW/ CCW    NMR SLR ABCs X 1  X 1  X 1    NMR Side planks  - modified 3 x 15" holds B modified 3 x 15" holds B   NMR cat/ cow  - X15  -   TE Prone Press Ups  X 10 5" hold       TE Prayer stretch  - -  X 5    NMR Quadruped hip hikes  3 x 10 B  - 3 x 10 B    NMR SL clamshells  YTB 10 x 10" holds B - YTB 10 x 10" holds B   NMR Bird Dogs  - - -   NMR Tall kneel pelvic thrust with powerband - - 2 x 10    PLAN              CPT Codes available for Billing:   (-) minutes of Manual therapy (MT) to improve pain and ROM.  (15) minutes of Therapeutic Exercise (TE) to develop strength, endurance, range of motion, and flexibility.  (25) minutes of Neuromuscular Re-Education (NMR)  to improve: Balance, Coordination, Kinesthetic, Sense, Proprioception, and Posture.  (-) minutes of Therapeutic Activities (TA) to improve functional performance.  (-) minutes of Gait Training (GT) to improve functional mobility and safety  Unattended Electrical Stimulation (ES) for muscle performance or pain modulation.  Vasopneumatic Device Therapy () for management of swelling/edema. (52284)  BFR: Blood flow restriction applied during exercise  NP or (-): Not Performed       Assessment & Plan   Assessment: Patient presents with increased soreness onset post one treatment session after a prolonged time without physical therapy. Due to soreness in the core, deep abdominal exercise was minimized with increased focus on hip stabilization. However, hamstring tightness and cramping due to soreness presented throughout treatment and modifications to exercises were made to address this with positive response. Patient tolerated treatment well.       The patient will continue to benefit from skilled outpatient physical therapy in order to address the deficits listed in the problem list on the initial evaluation, provide patient and family education, and maximize the patients level of independence in the home and community environments.     The patient's " spiritual, cultural, and educational needs were considered, and the patient is agreeable to the plan of care and goals.           Plan: Plan to progress per tolerance within POC    Goals:   Active       Functional outcome       Patient will show a significant change in FOTO score to 60 or better to demonstrate subjective improvement in overall function. (Progressing)       Start:  05/28/25    Expected End:  08/12/25            Patient will demonstrate independence in home program for support of progression (Met)       Start:  05/28/25    Expected End:  06/18/25    Resolved:  07/15/25            Strength       Patient will demonstrate strength improvements to stated goals listed in objective section of evaluation.  (Progressing)       Start:  05/28/25    Expected End:  08/12/25              Resolved       Maintaining body position       Patient will maintain plank of elbow positioning for 40 seconds or better without pain increase and minimal to no shaking. (Met)       Start:  05/28/25    Expected End:  07/09/25    Resolved:  07/15/25            Pain       Patient will report pain of 5/10 at worst demonstrating a reduction of overall pain (Met)       Start:  05/28/25    Expected End:  06/18/25    Resolved:  07/15/25             Anusha Grier, PT

## 2025-07-22 ENCOUNTER — CLINICAL SUPPORT (OUTPATIENT)
Dept: REHABILITATION | Facility: HOSPITAL | Age: 35
End: 2025-07-22
Payer: OTHER GOVERNMENT

## 2025-07-22 DIAGNOSIS — M62.81 WEAKNESS OF TRUNK MUSCULATURE: Primary | ICD-10-CM

## 2025-07-22 PROCEDURE — 97112 NEUROMUSCULAR REEDUCATION: CPT | Mod: PN

## 2025-07-22 PROCEDURE — 97110 THERAPEUTIC EXERCISES: CPT | Mod: PN

## 2025-07-22 NOTE — PROGRESS NOTES
"    Outpatient Rehab    Physical Therapy Visit    Patient Name: Idalia Hooper  MRN: [id number]  YOB: 1990  Encounter Date: 7/22/2025    Therapy Diagnosis:   Encounter Diagnosis   Name Primary?    Weakness of trunk musculature Yes     Physician: Bigg Blue MD    Physician Orders: Eval and Treat  Medical Diagnosis: Chronic low back pain, unspecified back pain laterality, unspecified whether sciatica present  Hypermobility arthralgia  Surgical Diagnosis: Not applicable for this Episode   Surgical Date: Not applicable for this Episode  Days Since Last Surgery: Not applicable for this Episode    Visit # / Visits Authorized:  9 / 20  Insurance Authorization Period: 5/27/2025 to 12/31/2025  Date of Evaluation: 5/28/2025  Plan of Care Certification: 7/15/2025 to 8/12/2025      Time In: 1100   Time Out: 1200  Total Time (in minutes): 60   Total Billable Time (in minutes):   60 minutes 1:1     FOTO:  Intake Score: 47%  Survey Score 2: 49%  Survey Score 3: 51%    Precautions:  Additional Precautions and Protocol Details: POTS      Subjective   Patient reports ongoing sinus congestion with cough making it hard to complete HEP and daily tasks. She states soreness from last visit resolved within a few days and minimal pain in low back.  Pain reported as 2/10.      Objective    To be reassessed at next progress note/ plan of care update         Treatment:      CPT Intervention  JointFocus Duration / Intensity  7/22/2025 Duration / Intensity  07/16/2025 Duration / Intensity  07/15/2025   TE Shuttle Squats  3min 5 cords 3 minutes 5 cords  -   NMR  TA with ball press  20x 5" alternating arms/legs - 20x 5" alternating arms/legs   TE Hamstring Stretch 3x30" 3x30" 3x30"   TE Figure 4 stretch with rotation 5 x 15" 5 x 15"  5 x 15"    TE To Stretch 3x30" 3 x 30"  3 x 30"    NMR DKTC  X15 10" holds  - X 15 10" holds at top   NMR Bridges 3x10 3x10 3x10   NMR PPT 3x10 5" 3x10 5" 3x10 5"   NMR SL hip abd circles  - " "2 x 20  CW / CCW 2 x 20  CW / CCW   NMR SLR ABCs X1 X 1  X 1    NMR Side planks  modified 3 x 15" holds B - modified 3 x 15" holds B   NMR cat/ cow  - - X15    TE Prone Press Ups  - X 10 5" hold     TE Prayer stretch  - - -    NMR Quadruped hip hikes  - 3 x 10 B  -   NMR SL clamshells  YTB 10 x 10" holds B YTB 10 x 10" holds B -   NMR Bird Dogs  2x10 5" - -   NMR Tall kneel pelvic thrust with powerband - - -   PLAN              CPT Codes available for Billing:   (-) minutes of Manual therapy (MT) to improve pain and ROM.  (30) minutes of Therapeutic Exercise (TE) to develop strength, endurance, range of motion, and flexibility.  (30) minutes of Neuromuscular Re-Education (NMR)  to improve: Balance, Coordination, Kinesthetic, Sense, Proprioception, and Posture.  (-) minutes of Therapeutic Activities (TA) to improve functional performance.  (-) minutes of Gait Training (GT) to improve functional mobility and safety  Unattended Electrical Stimulation (ES) for muscle performance or pain modulation.  Vasopneumatic Device Therapy () for management of swelling/edema. (86049)  BFR: Blood flow restriction applied during exercise  NP or (-): Not Performed       Assessment & Plan   Assessment: Patient presents with decreased pain levels and no soreness today. Therefore, core strengthening and stability exercises were added back to therapy today. Pt tolerate treatment well with minimal hamstring cramping noted with bridges then alleviated with modification. No pain noted in back throughout session.       The patient will continue to benefit from skilled outpatient physical therapy in order to address the deficits listed in the problem list on the initial evaluation, provide patient and family education, and maximize the patients level of independence in the home and community environments.     The patient's spiritual, cultural, and educational needs were considered, and the patient is agreeable to the plan of care and goals. "         Plan: Plan to progress per tolerance within POC    Goals:   Active       Functional outcome       Patient will show a significant change in FOTO score to 60 or better to demonstrate subjective improvement in overall function. (Progressing)       Start:  05/28/25    Expected End:  08/12/25            Patient will demonstrate independence in home program for support of progression (Met)       Start:  05/28/25    Expected End:  06/18/25    Resolved:  07/15/25            Strength       Patient will demonstrate strength improvements to stated goals listed in objective section of evaluation.  (Progressing)       Start:  05/28/25    Expected End:  08/12/25              Resolved       Maintaining body position       Patient will maintain plank of elbow positioning for 40 seconds or better without pain increase and minimal to no shaking. (Met)       Start:  05/28/25    Expected End:  07/09/25    Resolved:  07/15/25            Pain       Patient will report pain of 5/10 at worst demonstrating a reduction of overall pain (Met)       Start:  05/28/25    Expected End:  06/18/25    Resolved:  07/15/25             Anusha Grier, PT

## 2025-07-24 ENCOUNTER — CLINICAL SUPPORT (OUTPATIENT)
Dept: REHABILITATION | Facility: HOSPITAL | Age: 35
End: 2025-07-24
Payer: OTHER GOVERNMENT

## 2025-07-24 DIAGNOSIS — M62.81 WEAKNESS OF TRUNK MUSCULATURE: Primary | ICD-10-CM

## 2025-07-24 PROCEDURE — 97112 NEUROMUSCULAR REEDUCATION: CPT | Mod: PN

## 2025-07-24 NOTE — PROGRESS NOTES
"    Outpatient Rehab    Physical Therapy Visit    Patient Name: Idalia Hooper  MRN: [id number]  YOB: 1990  Encounter Date: 7/24/2025    Therapy Diagnosis:   Encounter Diagnosis   Name Primary?    Weakness of trunk musculature Yes     Physician: Bigg Blue MD    Physician Orders: Eval and Treat  Medical Diagnosis: Chronic low back pain, unspecified back pain laterality, unspecified whether sciatica present  Hypermobility arthralgia  Surgical Diagnosis: Not applicable for this Episode   Surgical Date: Not applicable for this Episode  Days Since Last Surgery: Not applicable for this Episode    Visit # / Visits Authorized:  10 / 20  Insurance Authorization Period: 5/27/2025 to 12/31/2025  Date of Evaluation: 5/28/2025  Plan of Care Certification: 7/15/2025 to 8/12/2025      Time In: 1050   Time Out: 1150  Total Time (in minutes): 60   Total Billable Time (in minutes):   35 minutes 1:1     FOTO:  Intake Score: 47%  Survey Score 2: 49%  Survey Score 3: 51%    Precautions:  Additional Precautions and Protocol Details: POTS      Subjective   Patient reports improvement with tolerance to laying down with decreased sinus issues today. She reports no increase in pain since last visit..  Pain reported as 2/10.      Objective    To be reassessed at next progress note/ plan of care update         Treatment:      CPT Intervention  JointFocus Duration / Intensity  7/24/2025 Duration / Intensity  7/22/2025 Duration / Intensity  07/16/2025   TE Shuttle Squats  3 min 5 cords 3min 5 cords 3 minutes 5 cords    NMR  TA with ball press  20x 5" alternating arms/legs 20x 5" alternating arms/legs -   TE Hamstring Stretch 3x30" 3x30" 3x30"   TE Figure 4 stretch with rotation 5x15" 5 x 15" 5 x 15"    TE To Stretch 3x30" 3x30" 3 x 30"    NMR DKTC  X15 10" hold  X15 10" holds  -   NMR Bridges X 10 3x10 3x10   NMR PPT /c marching 5" hold 2 x 10  3x10 5" 3x10 5"   NMR SL hip abd circles  - - 2 x 20  CW / CCW   NMR SLR " "ABCs X1 X1 X 1    NMR Side planks  modified 3 x 15" holds B modified 3 x 15" holds B -   NMR cat/ cow  - - -   TE Prone Press Ups  - - X 10 5" hold   TE Prayer stretch  - - -   NMR Quadruped hip hikes  - - 3 x 10 B    NMR SL clamshells  - YTB 10 x 10" holds B YTB 10 x 10" holds B   NMR Bird Dogs  2x10 5" 2x10 5" -   NMR Tall kneel pelvic thrust with powerband - - -   PLAN              CPT Codes available for Billing:   (-) minutes of Manual therapy (MT) to improve pain and ROM.  (-) minutes of Therapeutic Exercise (TE) to develop strength, endurance, range of motion, and flexibility.  (35) minutes of Neuromuscular Re-Education (NMR)  to improve: Balance, Coordination, Kinesthetic, Sense, Proprioception, and Posture.  (-) minutes of Therapeutic Activities (TA) to improve functional performance.  (-) minutes of Gait Training (GT) to improve functional mobility and safety  Unattended Electrical Stimulation (ES) for muscle performance or pain modulation.  Vasopneumatic Device Therapy () for management of swelling/edema. (05967)  BFR: Blood flow restriction applied during exercise  NP or (-): Not Performed       Assessment & Plan   Assessment: Patient presents with continued low pain levels. PT progression in neuromotor control of the core through static and dynamic movement to address endurance of household chores and caring for young children without pain. Noted challenge with increased core stability exercises, but no onset of pain. Hamstring cramping noted with glute strengthening exercises could not be alleviated with modifications, so they were ended at low rep in today's session.       The patient will continue to benefit from skilled outpatient physical therapy in order to address the deficits listed in the problem list on the initial evaluation, provide patient and family education, and maximize the patients level of independence in the home and community environments.     The patient's spiritual, cultural, " and educational needs were considered, and the patient is agreeable to the plan of care and goals.         Plan: Plan to progress per tolerance within POC    Goals:   Active       Functional outcome       Patient will show a significant change in FOTO score to 60 or better to demonstrate subjective improvement in overall function. (Progressing)       Start:  05/28/25    Expected End:  08/12/25            Patient will demonstrate independence in home program for support of progression (Met)       Start:  05/28/25    Expected End:  06/18/25    Resolved:  07/15/25            Strength       Patient will demonstrate strength improvements to stated goals listed in objective section of evaluation.  (Progressing)       Start:  05/28/25    Expected End:  08/12/25              Resolved       Maintaining body position       Patient will maintain plank of elbow positioning for 40 seconds or better without pain increase and minimal to no shaking. (Met)       Start:  05/28/25    Expected End:  07/09/25    Resolved:  07/15/25            Pain       Patient will report pain of 5/10 at worst demonstrating a reduction of overall pain (Met)       Start:  05/28/25    Expected End:  06/18/25    Resolved:  07/15/25             Anusha Grier, PT

## 2025-08-05 ENCOUNTER — CLINICAL SUPPORT (OUTPATIENT)
Dept: REHABILITATION | Facility: HOSPITAL | Age: 35
End: 2025-08-05
Payer: OTHER GOVERNMENT

## 2025-08-05 DIAGNOSIS — M62.81 WEAKNESS OF TRUNK MUSCULATURE: Primary | ICD-10-CM

## 2025-08-05 PROCEDURE — 97110 THERAPEUTIC EXERCISES: CPT | Mod: PN

## 2025-08-05 PROCEDURE — 97112 NEUROMUSCULAR REEDUCATION: CPT | Mod: PN

## 2025-08-05 NOTE — PROGRESS NOTES
"    Outpatient Rehab    Physical Therapy Visit    Patient Name: Idalia Hooper  MRN: [id number]  YOB: 1990  Encounter Date: 8/5/2025    Therapy Diagnosis:   Encounter Diagnosis   Name Primary?    Weakness of trunk musculature Yes       Physician: Bigg Blue MD    Physician Orders: Eval and Treat  Medical Diagnosis: Chronic low back pain, unspecified back pain laterality, unspecified whether sciatica present  Hypermobility arthralgia  Surgical Diagnosis: Not applicable for this Episode   Surgical Date: Not applicable for this Episode  Days Since Last Surgery: Not applicable for this Episode    Visit # / Visits Authorized:  11 / 20  Insurance Authorization Period: 5/27/2025 to 12/31/2025  Date of Evaluation: 5/28/2025  Plan of Care Certification: 7/15/2025 to 8/12/2025      Time In: 1100   Time Out: 1200  Total Time (in minutes): 60   Total Billable Time (in minutes):   30 minutes 1:1     FOTO:  Intake Score: 47%  Survey Score 2: 49%  Survey Score 3: 51%    Precautions:  Additional Precautions and Protocol Details: POTS      Subjective   She is doing okay in terms of pain, but feels increase in tension after recent long drive..  Pain reported as 2/10.      Objective    To be reassessed at next progress note/ plan of care update         Treatment:       CPT Intervention  [facility] Duration / Intensity  8/5/2025 Duration / Intensity  7/24/2025 Duration / Intensity  7/22/2025   TE Shuttle Squats  3 min 5 cords 3 min 5 cords 3min 5 cords   NMR  TA with ball press  20x 5" alternating arms/legs 20x 5" alternating arms/legs 20x 5" alternating arms/legs   TE Hamstring Stretch 3x30" 3x30" 3x30"   TE Figure 4 stretch with rotation 5x15" 5x15" 5 x 15"   TE To Stretch 3x30" 3x30" 3x30"   NMR DKTC  X15 10" hold  X15 10" hold  X15 10" holds    NMR Bridges X 10 X 10 3x10   NMR PPT /c marching 5" hold 2 x 10  /c marching 5" hold 2 x 10  3x10 5"   NMR SL hip abd circles  - - -   NMR SLR ABCs X1 X1 X1   NMR " "Side planks  modified 3 x 15" holds B modified 3 x 15" holds B modified 3 x 15" holds B   NMR cat/ cow  - - -   TE Prone Press Ups  - - -   TE Prayer stretch  - - -   NMR Quadruped hip hikes  x10 B - -   NMR SL clamshells  - - YTB 10 x 10" holds B   NMR Bird Dogs  2x10 5" 2x10 5" 2x10 5"   NMR Tall kneel pelvic thrust with powerband - - -   PLAN              CPT Codes available for Billing:   (-) minutes of Manual therapy (MT) to improve pain and ROM.  (10) minutes of Therapeutic Exercise (TE) to develop strength, endurance, range of motion, and flexibility.  (20) minutes of Neuromuscular Re-Education (NMR)  to improve: Balance, Coordination, Kinesthetic, Sense, Proprioception, and Posture.  (-) minutes of Therapeutic Activities (TA) to improve functional performance.  (-) minutes of Gait Training (GT) to improve functional mobility and safety  Unattended Electrical Stimulation (ES) for muscle performance or pain modulation.  Vasopneumatic Device Therapy () for management of swelling/edema. (03415)  BFR: Blood flow restriction applied during exercise  NP or (-): Not Performed       Assessment & Plan   Assessment: Patient presents with continued low pain levels, but increase tension is noted. Therefore, session started with more a mobility focus to address tension complaints. Continued with neuromotor core muscle work following this. Pt notes increased fatigue after period without form therapy on vacation.        The patient will continue to benefit from skilled outpatient physical therapy in order to address the deficits listed in the problem list on the initial evaluation, provide patient and family education, and maximize the patients level of independence in the home and community environments.     The patient's spiritual, cultural, and educational needs were considered, and the patient is agreeable to the plan of care and goals.         Plan: Plan to progress per tolerance within POC    Goals:   Active       " Functional outcome       Patient will show a significant change in FOTO score to 60 or better to demonstrate subjective improvement in overall function. (Progressing)       Start:  05/28/25    Expected End:  08/12/25            Patient will demonstrate independence in home program for support of progression (Met)       Start:  05/28/25    Expected End:  06/18/25    Resolved:  07/15/25            Strength       Patient will demonstrate strength improvements to stated goals listed in objective section of evaluation.  (Progressing)       Start:  05/28/25    Expected End:  08/12/25              Resolved       Maintaining body position       Patient will maintain plank of elbow positioning for 40 seconds or better without pain increase and minimal to no shaking. (Met)       Start:  05/28/25    Expected End:  07/09/25    Resolved:  07/15/25            Pain       Patient will report pain of 5/10 at worst demonstrating a reduction of overall pain (Met)       Start:  05/28/25    Expected End:  06/18/25    Resolved:  07/15/25               Anusha Grier, PT

## 2025-08-12 ENCOUNTER — OFFICE VISIT (OUTPATIENT)
Dept: ALLERGY | Facility: CLINIC | Age: 35
End: 2025-08-12
Payer: OTHER GOVERNMENT

## 2025-08-12 ENCOUNTER — CLINICAL SUPPORT (OUTPATIENT)
Dept: REHABILITATION | Facility: HOSPITAL | Age: 35
End: 2025-08-12
Payer: OTHER GOVERNMENT

## 2025-08-12 VITALS
OXYGEN SATURATION: 96 % | HEIGHT: 68 IN | DIASTOLIC BLOOD PRESSURE: 66 MMHG | HEART RATE: 74 BPM | BODY MASS INDEX: 23.19 KG/M2 | WEIGHT: 153 LBS | SYSTOLIC BLOOD PRESSURE: 103 MMHG

## 2025-08-12 DIAGNOSIS — J45.20 MILD INTERMITTENT ASTHMA WITHOUT COMPLICATION: ICD-10-CM

## 2025-08-12 DIAGNOSIS — R06.02 SHORTNESS OF BREATH: ICD-10-CM

## 2025-08-12 DIAGNOSIS — J45.901 ASTHMA WITH ACUTE EXACERBATION, UNSPECIFIED ASTHMA SEVERITY, UNSPECIFIED WHETHER PERSISTENT: ICD-10-CM

## 2025-08-12 DIAGNOSIS — J45.30 MILD PERSISTENT ASTHMA WITHOUT COMPLICATION: Primary | ICD-10-CM

## 2025-08-12 DIAGNOSIS — M62.81 WEAKNESS OF TRUNK MUSCULATURE: Primary | ICD-10-CM

## 2025-08-12 PROCEDURE — 99999 PR PBB SHADOW E&M-EST. PATIENT-LVL IV: CPT | Mod: PBBFAC,,, | Performed by: STUDENT IN AN ORGANIZED HEALTH CARE EDUCATION/TRAINING PROGRAM

## 2025-08-12 PROCEDURE — 97110 THERAPEUTIC EXERCISES: CPT | Mod: PN

## 2025-08-12 PROCEDURE — 99214 OFFICE O/P EST MOD 30 MIN: CPT | Mod: PBBFAC | Performed by: STUDENT IN AN ORGANIZED HEALTH CARE EDUCATION/TRAINING PROGRAM

## 2025-08-12 PROCEDURE — 99215 OFFICE O/P EST HI 40 MIN: CPT | Mod: S$PBB,,, | Performed by: STUDENT IN AN ORGANIZED HEALTH CARE EDUCATION/TRAINING PROGRAM

## 2025-08-12 RX ORDER — PREDNISONE 20 MG/1
40 TABLET ORAL DAILY
Qty: 10 TABLET | Refills: 0 | Status: SHIPPED | OUTPATIENT
Start: 2025-08-12 | End: 2025-08-17

## 2025-08-12 RX ORDER — EPINEPHRINE 0.3 MG/.3ML
1 INJECTION SUBCUTANEOUS ONCE
Qty: 0.3 ML | Refills: 0 | Status: SHIPPED | OUTPATIENT
Start: 2025-08-12 | End: 2025-08-12

## 2025-08-12 RX ORDER — BUDESONIDE AND FORMOTEROL FUMARATE DIHYDRATE 160; 4.5 UG/1; UG/1
2 AEROSOL RESPIRATORY (INHALATION) EVERY 12 HOURS
Qty: 6 G | Refills: 0 | Status: SHIPPED | OUTPATIENT
Start: 2025-08-12 | End: 2025-08-26

## 2025-08-12 RX ORDER — MONTELUKAST SODIUM 10 MG/1
10 TABLET ORAL DAILY
Qty: 90 TABLET | Refills: 3 | Status: SHIPPED | OUTPATIENT
Start: 2025-08-12 | End: 2026-08-12

## 2025-08-12 RX ORDER — ALBUTEROL SULFATE 90 UG/1
2 INHALANT RESPIRATORY (INHALATION) EVERY 4 HOURS PRN
Qty: 18 G | Refills: 2 | Status: SHIPPED | OUTPATIENT
Start: 2025-08-12

## 2025-08-14 ENCOUNTER — PROCEDURE VISIT (OUTPATIENT)
Dept: ALLERGY | Facility: CLINIC | Age: 35
End: 2025-08-14
Payer: OTHER GOVERNMENT

## 2025-08-14 ENCOUNTER — OFFICE VISIT (OUTPATIENT)
Dept: INTERNAL MEDICINE | Facility: CLINIC | Age: 35
End: 2025-08-14
Payer: OTHER GOVERNMENT

## 2025-08-14 VITALS
RESPIRATION RATE: 16 BRPM | TEMPERATURE: 98 F | OXYGEN SATURATION: 97 % | HEART RATE: 99 BPM | DIASTOLIC BLOOD PRESSURE: 64 MMHG | WEIGHT: 154.31 LBS | SYSTOLIC BLOOD PRESSURE: 108 MMHG | BODY MASS INDEX: 23.46 KG/M2

## 2025-08-14 DIAGNOSIS — F98.8 ATTENTION DEFICIT DISORDER (ADD) WITHOUT HYPERACTIVITY: Primary | ICD-10-CM

## 2025-08-14 DIAGNOSIS — Z91.038 HYMENOPTERA ALLERGY: Primary | ICD-10-CM

## 2025-08-14 PROCEDURE — 95117 IMMUNOTHERAPY INJECTIONS: CPT | Mod: PBBFAC

## 2025-08-14 PROCEDURE — 99214 OFFICE O/P EST MOD 30 MIN: CPT | Mod: S$PBB,,, | Performed by: FAMILY MEDICINE

## 2025-08-14 PROCEDURE — 99999 PR PBB SHADOW E&M-EST. PATIENT-LVL IV: CPT | Mod: PBBFAC,,, | Performed by: FAMILY MEDICINE

## 2025-08-14 PROCEDURE — 99214 OFFICE O/P EST MOD 30 MIN: CPT | Mod: PBBFAC | Performed by: FAMILY MEDICINE

## 2025-08-14 PROCEDURE — 95146 ANTIGEN THERAPY SERVICES: CPT | Mod: PBBFAC

## 2025-08-14 RX ORDER — LISDEXAMFETAMINE DIMESYLATE 10 MG/1
10 CAPSULE ORAL EVERY MORNING
Qty: 30 CAPSULE | Refills: 0 | Status: SHIPPED | OUTPATIENT
Start: 2025-08-14

## 2025-08-18 ENCOUNTER — OFFICE VISIT (OUTPATIENT)
Dept: PSYCHIATRY | Facility: CLINIC | Age: 35
End: 2025-08-18
Payer: OTHER GOVERNMENT

## 2025-08-18 VITALS — DIASTOLIC BLOOD PRESSURE: 65 MMHG | SYSTOLIC BLOOD PRESSURE: 106 MMHG

## 2025-08-18 DIAGNOSIS — F33.1 MODERATE EPISODE OF RECURRENT MAJOR DEPRESSIVE DISORDER: Primary | ICD-10-CM

## 2025-08-18 DIAGNOSIS — F41.1 GENERALIZED ANXIETY DISORDER WITH PANIC ATTACKS: ICD-10-CM

## 2025-08-18 DIAGNOSIS — F12.20 CANNABIS USE DISORDER, MODERATE, DEPENDENCE: ICD-10-CM

## 2025-08-18 DIAGNOSIS — F43.10 PTSD (POST-TRAUMATIC STRESS DISORDER): ICD-10-CM

## 2025-08-18 DIAGNOSIS — F41.0 GENERALIZED ANXIETY DISORDER WITH PANIC ATTACKS: ICD-10-CM

## 2025-08-18 DIAGNOSIS — F90.0 ATTENTION DEFICIT HYPERACTIVITY DISORDER (ADHD), PREDOMINANTLY INATTENTIVE TYPE: ICD-10-CM

## 2025-08-18 PROCEDURE — 99999 PR PBB SHADOW E&M-EST. PATIENT-LVL III: CPT | Mod: PBBFAC,,, | Performed by: PSYCHOLOGIST

## 2025-08-18 PROCEDURE — 99205 OFFICE O/P NEW HI 60 MIN: CPT | Mod: S$PBB,,, | Performed by: PSYCHOLOGIST

## 2025-08-18 PROCEDURE — 99213 OFFICE O/P EST LOW 20 MIN: CPT | Mod: PBBFAC | Performed by: PSYCHOLOGIST

## 2025-08-18 RX ORDER — LISDEXAMFETAMINE DIMESYLATE 10 MG/1
10 CAPSULE ORAL EVERY MORNING
Qty: 30 CAPSULE | Refills: 0 | Status: SHIPPED | OUTPATIENT
Start: 2025-08-18

## 2025-08-18 RX ORDER — SERTRALINE HYDROCHLORIDE 50 MG/1
50 TABLET, FILM COATED ORAL DAILY
Qty: 30 TABLET | Refills: 2 | Status: SHIPPED | OUTPATIENT
Start: 2025-08-18

## 2025-08-20 ENCOUNTER — CLINICAL SUPPORT (OUTPATIENT)
Dept: REHABILITATION | Facility: HOSPITAL | Age: 35
End: 2025-08-20
Payer: OTHER GOVERNMENT

## 2025-08-20 DIAGNOSIS — M62.81 WEAKNESS OF TRUNK MUSCULATURE: Primary | ICD-10-CM

## 2025-08-20 PROCEDURE — 97110 THERAPEUTIC EXERCISES: CPT | Mod: PN

## 2025-08-20 PROCEDURE — 97112 NEUROMUSCULAR REEDUCATION: CPT | Mod: PN

## 2025-08-27 ENCOUNTER — CLINICAL SUPPORT (OUTPATIENT)
Dept: REHABILITATION | Facility: HOSPITAL | Age: 35
End: 2025-08-27
Payer: OTHER GOVERNMENT

## 2025-08-27 DIAGNOSIS — M62.81 WEAKNESS OF TRUNK MUSCULATURE: Primary | ICD-10-CM

## 2025-08-27 PROCEDURE — 97110 THERAPEUTIC EXERCISES: CPT | Mod: PN

## 2025-08-27 PROCEDURE — 97112 NEUROMUSCULAR REEDUCATION: CPT | Mod: PN

## 2025-09-03 ENCOUNTER — CLINICAL SUPPORT (OUTPATIENT)
Dept: REHABILITATION | Facility: HOSPITAL | Age: 35
End: 2025-09-03
Payer: OTHER GOVERNMENT

## 2025-09-03 DIAGNOSIS — M62.81 WEAKNESS OF TRUNK MUSCULATURE: Primary | ICD-10-CM

## 2025-09-03 PROCEDURE — 97112 NEUROMUSCULAR REEDUCATION: CPT | Mod: PN

## 2025-09-03 PROCEDURE — 97110 THERAPEUTIC EXERCISES: CPT | Mod: PN
